# Patient Record
Sex: FEMALE | Race: WHITE | NOT HISPANIC OR LATINO | Employment: OTHER | URBAN - METROPOLITAN AREA
[De-identification: names, ages, dates, MRNs, and addresses within clinical notes are randomized per-mention and may not be internally consistent; named-entity substitution may affect disease eponyms.]

---

## 2017-01-03 ENCOUNTER — ALLSCRIPTS OFFICE VISIT (OUTPATIENT)
Dept: OTHER | Facility: OTHER | Age: 62
End: 2017-01-03

## 2017-01-18 ENCOUNTER — GENERIC CONVERSION - ENCOUNTER (OUTPATIENT)
Dept: OTHER | Facility: OTHER | Age: 62
End: 2017-01-18

## 2017-02-10 ENCOUNTER — ALLSCRIPTS OFFICE VISIT (OUTPATIENT)
Dept: OTHER | Facility: OTHER | Age: 62
End: 2017-02-10

## 2017-03-24 ENCOUNTER — HOSPITAL ENCOUNTER (OUTPATIENT)
Dept: RADIOLOGY | Facility: MEDICAL CENTER | Age: 62
Discharge: HOME | End: 2017-03-24
Payer: COMMERCIAL

## 2017-03-24 ENCOUNTER — ALLSCRIPTS OFFICE VISIT (OUTPATIENT)
Dept: OTHER | Facility: OTHER | Age: 62
End: 2017-03-24

## 2017-03-24 DIAGNOSIS — Z47.1 AFTERCARE FOLLOWING JOINT REPLACEMENT SURGERY: ICD-10-CM

## 2017-03-24 PROCEDURE — 73502 X-RAY EXAM HIP UNI 2-3 VIEWS: CPT

## 2018-01-09 NOTE — RESULT NOTES
Message   She has moderate arthritis in the Knee and Severe arthritis in the Hip  I would recommend that she sees an orthopedic surgeon  Verified Results  * XR KNEE 3 VW LEFT NON INJURY [id number] 12:01PM Eliudny Giana Order Number: [id number]     Test Name Result Flag Reference   XR KNEE 3 VW LEFT (Report)     LEFT KNEE     INDICATION: Left knee pain for years  COMPARISON: None     VIEWS: 3; 3 images     FINDINGS:     There is no acute fracture or dislocation  There is a moderate joint effusion  Tricompartmental degenerative osteoarthritis with joint space narrowing and marginal osteophyte formation most severely affecting the medial compartment  No lytic or blastic lesions are seen  Soft tissues are unremarkable  IMPRESSION:     Moderate joint effusion  Tricompartmental degenerative osteoarthritis with joint space narrowing and marginal osteophyte formation most severely affecting the medial compartment  Workstation performed: [id number]     Signed by: Mikayla Pop MD   8/25/16     * XR HIP/PELV 2-3 VWS RIGHT W PELVIS IF PERFORMED [id number] 12:01PM Chris Faria Order Number: [id number]     Test Name Result Flag Reference   * XR HIP/PELV 2-3 VWS RIGHT (Report)     RIGHT HIP     INDICATION: Right hip pain  COMPARISON: None     VIEWS: AP pelvis and 2 coned down views of the hip; 3 images     FINDINGS:     There is no acute fracture or dislocation  Severe right hip joint osteoarthritis with complete loss of the joint space, subchondral cyst formation, marginal osteophyte formation and sclerosis  Mild left hip joint osteoarthritis  No lytic or blastic lesions are seen  Soft tissues are unremarkable  IMPRESSION:     Severe right hip joint osteoarthritis with complete loss of the joint space, subchondral cyst formation, marginal osteophyte formation and sclerosis  Mild left hip joint osteoarthritis         Workstation performed: [id number]     Signed by:    Ursula Busch MD   8/25/16

## 2018-01-11 NOTE — RESULT NOTES
Message   she has nodules in her thyroid  They want to review previous imaging  to determine if any biopsy needs to be done  Did she get these records yet? Verified Results  US THYROID [id number] 12:23PM Aubrie Olson Order Number: [id number]    - Patient Instructions: To schedule this appointment, please contact Central Scheduling at [phone]  Test Name Result Flag Reference   US THYROID (Report)     THYROID ULTRASOUND     INDICATION: 43-year-old with thyroid nodules for follow-up  COMPARISON: Prior outside studies remain unavailable at this time  TECHNIQUE:  Ultrasound of the thyroid was performed with a high frequency linear transducer in transverse and sagittal planes including volumetric imaging sweeps as well as traditional still imaging technique  FINDINGS:   Normal homogeneous smooth echotexture  Right gland: 6 0 x 3 1 x 3 9 cm  There is a large complex cystic and solid mass occupying much of the RIGHT gland measuring 5 0 x 2 7 x 3 5 cm  There is perinodular color Doppler flow with questionable minimal intralesional flow  VERY LOW SUSPICION PATTERN (estimated risk of malignancy   < 3%) consider FNA > 2 cm versus observation  If patient's prior outside studies document the presence and stability of this finding, continued observation is a reasonable course of action  Anterior medial RIGHT upper pole measuring 1 0 x 0 5 x 0 9 cm  Solid hypoechoic nodule  INTERMEDIATE SUSPICION PATTERN (estimated risk of malignancy 10-20%) FNA recommended at > 1 cm  As noted above, if this is a stable finding when prior outside    studies become available, continued surveillance a be considered  Left gland: 4 4 x 2 0 x 1 8 cm  No dominant nodules  Medial posterior upper pole measuring 0 7 x 0 5 x 0 6 cm  Solid isoechoic nodule  LOW SUSPICION PATTERN (Estimated risk of malignancy 5-10%)  FNA recommended at >1 5 cm     Isthmus: 0 3 cm in AP dimension     No dominant nodules  IMPRESSION:      Bilateral nodules as described above  Prior outside studies as yet remain unavailable for comparison  An addendum will be provided when these become available  If these remain unavailable, FNAB may be considered for the dominant right-sided lesion and   borderline RIGHT upper pole hypoechoic nodule         Workstation performed: [id number]     Signed by:   Violetta Hollis MD   11/26/16

## 2018-01-12 NOTE — RESULT NOTES
Message   she has osteopenia - take calcium 1200 mg and 1000 units Vit D per day     Verified Results  * DXA BONE DENSITY SPINE HIP AND PELVIS [id number] 02:12PM Zelda Dutta Order Number: [id number]     Test Name Result Flag Reference   DXA BONE DENSITY SPINE HIP AND PELVIS (Report)     CENTRAL DXA SCAN     CLINICAL HISTORY:  59year old post-menopausal  female with history of asthma  The patient does not exercise and takes vitamin D supplements  TECHNIQUE: Bone densitometry was performed using a Hologic Horizon C bone densitometer  Regions of interest appear properly placed  There are degenerative changes of the lower lumbar spine  The BMD of L4 is elevated, compared to the BMD of L1-L3, most   likely secondary to degenerative change, although this should correlated with the patient's clinical history  L4 was excluded from calculation of lumbar spine BMD      COMPARISON: None  RESULTS:    LUMBAR SPINE: L1-L3:   BMD 1 053 gm/cm2   T-score 0 3   Z-score 1 9     LEFT TOTAL HIP:   BMD 0 865 gm/cm2   T-score -0 6   Z-score 0 5     LEFT FEMORAL NECK:   BMD 0 712 gm/cm2   T-score -1 2   Z-score 0 2             IMPRESSION:   1  Based on the [facility] classification, the T-score of -1 2 in the left femoral neck is consistent with low bone mineral density  2  The 10 year risk of hip fracture is 0 6 %, with the 10 year risk of major osteoporotic fracture being 7 8 %, as calculated by the WHO fracture risk assessment tool (FRAX)  The current NOF guidelines recommend treating patients with FRAX 10 year risk   score of >3% for hip fracture and >20% for major osteoporotic fracture  3  Any secondary causes of low bone mineral density should be excluded prior to treatment, if clinically indicated     4  A daily intake of at least 1200 mg calcium and 800 to 1000 IU of Vitamin D, as well as weight bearing and muscle strengthening exercise, fall prevention and avoidance of tobacco and excessive alcohol intake as basic preventive measures are suggested  5  Repeat DXA in 18 - 24 months, on the same machine, as clinically indicated         WHO CLASSIFICATION:   Normal (a T-score of -1 0 or higher)   Low bone mineral density (a T-score of less than -1 0 but higher than -2 5)   Osteoporosis (a T-score of -2 5 or less)   Severe osteoporosis (a T-score of -2 5 or less with a fragility fracture)             Workstation performed: [id number]     Signed by:   Kayla Miranda MD   9/29/16

## 2018-01-12 NOTE — RESULT NOTES
Message   breast u/s is normal  recheck yearly mammogram     Verified Results  *US BREAST RIGHT LIMITED (DIAGNOSTIC) [id number] 01:03PM Moo Mcintosh Order Number: [id number]    - Patient Instructions: To schedule this appointment, please contact Central Scheduling at [phone]  Test Name Result Flag Reference   US BREAST RIGHT LIMITED (Report)     Patient History:   Patient is postmenopausal and had first child at age 26  Family history of unknown cancer in maternal uncle at age 48 or    over and breast cancer in sister at age 50  Patient is a former smoker  Patient's BMI is 30 4  Reason for exam: additional evaluation requested from abnormal    screening  Mammo Diagnostic Right W CAD: 2016 - Check In #:    [de-identified]   ML and spot compression CC view(s) were taken of the right    breast      Technologist: KAYDEN Garduno)(M)   Prior study comparison: 2016, mammo screening    bilateral W CAD, performed at [place] [address]  2014, bilateral screening mammogram, performed at    [facility]  Follow-up images of the right breast reveal questionable    persistence of nodular asymmetry seen on screening mammography  Targeted ultrasound images the right breast were obtained  11:00   6 cm from the nipple, a 3 x 3 x 3 mm cyst is identified    potentially corresponding to the mammographic finding  No other    abnormalities were seen  US Breast Right Limited: 2016 - Check In #: [de-identified]   Technologist: RT Christian (R), RDMS     ASSESSMENT: BiRad:2 - Benign (Overall)     Recommendation:   Return to routine screening mammogram schedule  Analyzed by CAD     Transcription Location: [address]   Signing Station: [id number]     Risk Value(s):   Tyrer-Cuzick 10 Year: 9 810%, Tyrer-Cuzick Lifetime: 21 026%,    Myriad Table: 2 6%, AVINASH 5 Year: 3 2%, NCI Lifetime: 13 1%   Signed by:    Riki العلي MD   10/7/16     MAMMO DIAGNOSTIC RIGHT W CAD [id number] 01:03PM Sherita Ragsdale    Order Number: [id number]    - Patient Instructions: To schedule this appointment, please contact Central Scheduling at [phone]  Do not wear any perfume, powder, lotion or deodorant on breast or underarm area  Please bring your doctors order, referral (if needed) and insurance information with you on the day of the test  Failure to bring this information may result in this test being rescheduled  Arrive 15 minutes prior to your appointment time to register  On the day of your test, please bring any prior mammogram or breast studies with you that were not performed at a [facility]  Failure to bring prior exams may result in your test needing to be rescheduled  Test Name Result Flag Reference   MAMMO DIAGNOSTIC RIGHT W CAD (Report)     Patient History:   Patient is postmenopausal and had first child at age 26  Family history of unknown cancer in maternal uncle at age 48 or    over and breast cancer in sister at age 50  Patient is a former smoker  Patient's BMI is 30 4  Reason for exam: additional evaluation requested from abnormal    screening  Mammo Diagnostic Right W CAD: October 7, 2016 - Check In #:    [de-identified]   ML and spot compression CC view(s) were taken of the right    breast      Technologist: RT Lakisha(R)(M)   Prior study comparison: September 14, 2016, mammo screening    bilateral W CAD, performed at [address] [place] [address],[address]  June 19, 2014, bilateral screening mammogram, performed at    [facility]  Follow-up images of the right breast reveal questionable    persistence of nodular asymmetry seen on screening mammography  Targeted ultrasound images the right breast were obtained  11:00   6 cm from the nipple, a 3 x 3 x 3 mm cyst is identified    potentially corresponding to the mammographic finding   No other    abnormalities were seen  US Breast Right Limited: October 7, 2016 - Check In #: [de-identified]   Technologist: Willy Edwards, RT (R), RDMS     ASSESSMENT: BiRad:2 - Benign (Overall)     Recommendation:   Return to routine screening mammogram schedule  Analyzed by CAD     Transcription Location: [address]   Signing Station: [id number]     Risk Value(s):   Pollyer-Cuzick 10 Year: 9 810%, Pollyer-Barbara Lifetime: 21 026%,    Myriad Table: 2 6%, AVINASH 5 Year: 3 2%, NCI Lifetime: 13 1%   Signed by:    Nieves Macdonald MD   10/7/16

## 2018-01-12 NOTE — MISCELLANEOUS
Assessment    1  Arthritis of right hip (716 95) (M19 90)   2  Thyroid nodule (241 0) (E04 1)   3  Aftercare following right hip joint replacement surgery (V54 81,V43 64) (Z47 1,Z96 641)    Plan  Thyroid nodule    · US THYROID; Status:Hold For - Scheduling; Requested for:57Jtr8150;    Perform:[facility]; Due:69Sme7986; Ordered; For:Thyroid nodule; Ordered By:Edis Sloan Ards;    Discussion/Summary  Discussion Summary:   She is recovering well after a R hip replacement  She will continue therapy and f/u with her surgeon  For the thyroid nodule will plan to repeat u/s in 1 year  Medication SE Review and Pt Understands Tx: Possible side effects of new medications were reviewed with the patient/guardian today  The treatment plan was reviewed with the patient/guardian  The patient/guardian understands and agrees with the treatment plan      Chief Complaint  Chief Complaint Free Text Note Form: Patient is here for a hospital follow up for hip replacement  History of Present Illness  TCM Communication [facility] [place] [place] records were reviewed  She was hospitalized at [facility]  The date of admission: 12/19/2016, date of discharge: 12/21/2016  Diagnosis: hip replacement  She was discharged to home  Medications reviewed and updated today  Symptoms: weakness, fatigue, constipation, swelling and swelling location leg, but no fever, no dizziness, no headache, no cough, no shortness of breath, no chest pain, no back pain on left side, no back pain on right side, no arm pain left side, no arm pain on right side, no leg pain on left side, no leg pain on right side, no upper abdominal pain, no middle abdominal pain, no lower abdominal pain, no rash:, no anorexia, no nausea, no vomiting, no loose stools, no pain with urinating, no incisional pain and no wound drainage   facial numbness   Communication performed and completed by venkat fraga   HPI: 59year old here to f/u on R hip replacement on 12/19/16 with   Laura  She is only take Tylenol for pain She is feeling very well  She states there were no complications and her recovery is going well  She is doing PT  She is on Lovenox for anticoagulation  She had a thyroid u/s and biopsy for a suspicious nodule  Pathology was benign  She is asking about follow up from this  Review of Systems  Complete-Female:   Constitutional: No fever, no chills, feels well, no tiredness, no recent weight gain or weight loss  Eyes: No complaints of eye pain, no red eyes, no eyesight problems, no discharge, no dry eyes, no itching of eyes  ENT: no complaints of earache, no loss of hearing, no nose bleeds, no nasal discharge, no sore throat, no hoarseness  Cardiovascular: No complaints of slow heart rate, no fast heart rate, no chest pain, no palpitations, no leg claudication, no lower extremity edema  Respiratory: No complaints of shortness of breath, no wheezing, no cough, no SOB on exertion, no orthopnea, no PND  Gastrointestinal: No complaints of abdominal pain, no constipation, no nausea or vomiting, no diarrhea, no bloody stools  Genitourinary: No complaints of dysuria, no incontinence, no pelvic pain, no dysmenorrhea, no vaginal discharge or bleeding  Musculoskeletal: as noted in HPI  Integumentary: No complaints of skin rash or lesions, no itching, no skin wounds, no breast pain or lump  Neurological: No complaints of headache, no confusion, no convulsions, no numbness, no dizziness or fainting, no tingling, no limb weakness, no difficulty walking  Psychiatric: Not suicidal, no sleep disturbance, no anxiety or depression, no change in personality, no emotional problems  Endocrine: No complaints of proptosis, no hot flashes, no muscle weakness, no deepening of the voice, no feelings of weakness  Hematologic/Lymphatic: No complaints of swollen glands, no swollen glands in the neck, does not bleed easily, does not bruise easily        Active Problems 1  Arthritis of back (721 90) (M47 9)   2  Arthritis of knee, left (716 96) (M19 90)   3  Arthritis of right hip (716 95) (M19 90)   4  Enlarged thyroid (240 9) (E04 9)   5  Knee crepitus, right (719 66) (M23 8X1)   6  Neck arthritis (721 0) (M46 92)   7  Need for pneumococcal vaccination (V03 82) (Z23)   8  Pap smear for cervical cancer screening (V76 2) (Z12 4)   9  Scoliosis (737 30) (M41 9)   10  Thyroid nodule (241 0) (E04 1)    Past Medical History    1  History of Acute meniscal tear of left knee (836 2) (S83 207A)   2  Acute upper respiratory infection (465 9) (J06 9)    Surgical History    1  History of Appendectomy   2  History of Arthroscopy Knee Left   3  History of Biopsy Thyroid Using Percutaneous Core Needle   4  History of Cardiovascular Stress Test   5  History of  Section   6  History of Knee Surgery   7  History of Tonsillectomy   8  History of Total Hip Replacement Right  Surgical History Reviewed: The surgical history was reviewed and updated today  Family History  Mother    1  Family history of cardiac disorder (V17 49) (Z82 49)   2  Family history of hypertension (V17 49) (Z82 49)  Father    3  Family history of cardiac disorder (V17 49) (Z82 49)   4  Family history of hypertension (V17 49) (Z82 49)  Family History Reviewed: The family history was reviewed and updated today  Social History    · Former smoker (R57 87) (F06 411)  Social History Reviewed: The social history was reviewed and updated today  Current Meds   1  Cyclobenzaprine HCl - 10 MG Oral Tablet; TAKE 1 TABLET Every 8 hours PRN muscle   spasms; Therapy: 48JOK4979 to (Marcin Host)  Requested for: 62EVC5504; Last   Rx:2016 Ordered   2  Meclizine HCl - 25 MG Oral Tablet; TAKE 1 TABLET 3 TIMES DAILY AS NEEDED    Requested for: 72TXG2781; Last Rx:2016 Ordered   3  Multi-Vitamin TABS; Therapy: (Recorded:52Uas7736) to Recorded  Medication List Reviewed:    The medication list was reviewed and updated today  Allergies    1  No Known Drug Allergies    Vitals  Signs   Recorded: 89QSJ3389 10:52AM   Temperature: 98 3 F  Heart Rate: 90  Systolic: 353  Diastolic: 72  Height: 5 ft 8 in  Weight: 196 lb 9 6 oz  BMI Calculated: 29 89  BSA Calculated: 2 03  O2 Saturation: 96    Physical Exam    Constitutional   General appearance: No acute distress, well appearing and well nourished  Pulmonary   Respiratory effort: No increased work of breathing or signs of respiratory distress  Auscultation of lungs: Clear to auscultation  Cardiovascular   Auscultation of heart: Normal rate and rhythm, normal S1 and S2, without murmurs  Musculoskeletal   Gait and station: Abnormal   Gait evaluation demonstrated limping on the right  Skin   Skin and subcutaneous tissue: Abnormal   incision c/d/i R hip  Psychiatric   Orientation to person, place, and time: Normal     Mood and affect: Normal       The thyroid was diffusely enlarged  large midline nodule      Results/Data  (1) FINE NEEDLE ASPIRATION 58UDF7722 09:34AM Get [facility]     Test Name Result Flag Reference   LAB AP CASE REPORT (Report)     Non-gynecologic Cytology             Case: [id number]                  Authorizing Provider: Brina Maki MD     Collected:      12/15/2016 0934        Ordering Location:   [address] [place] [address]   Received:      12/15/2016 [address] [place] [address] Ultrasound                              Pathologist:      Yessenia Muñoz MD                             Specimens:  A) - Thyroid, Right, mid                                        B) - Thyroid, Right, mid                                        C) - Thyroid, Right, anterior medial upper                               D) - Thyroid, Right, anterior medial upper   LAB AP CYTO FINAL DIAGNOSIS (Report)     A - B  Thyroid, right mid, fine needle aspiration (Thin prep and smear   preparations):  Negative for malignancy (Brillion Category II) - See note    Scattered groups of benign follicular cells and scant colloid  Satisfactory for evaluation  C - D  Thyroid, right anterior medial upper, fine needle aspiration (Thin   prep and smear preparations):  Negative for malignancy (Kelly Category II) - See note  Scattered groups of benign follicular cells and scant colloid  Satisfactory for evaluation  Note: As reported in the [facility] for Reporting Thyroid   Cytopathology*, the diagnostic category of benign/negative for   malignancy carries 0% to 3% risk of malignancy being found in subsequent   resections (in the few studies of patients with benign FNA results that   were followed long-term, a falsenegative rate of <1% was reported), with   the usual management being clinical follow-up supplemented by  ultrasonography (US) as indicated  Repeat FNA may be indicated for nodules   showing significant growth or developing US abnormalities  Ultimately,   clinical/imaging correlation for this patient is needed in arriving at the   actual management plan  *The Kelly System for Reporting Thyroid Cytopathology, [facility] ), 2010  (1st ed )  Electronically signed by Axel Calhoun MD on 12/16/2016 at 1:55 PM   LAB AP INTRAOPERATIVE CONSULTATION      Thyroid, right mid, FNAB on-site evaluation: Adequate    Thyroid, right anterior medial upper,  FNAB on-site evaluation: Adequate  Dr Marci Tan   LAB AP GROSS DESCRIPTION (Report)     A  Thyroid, Right, mid: 20ml  Clear, received in CytoLyt    B  Thyroid, Right, mid: 6 slides received ( 3 diff quik / 3 alcohol fixed   )    C  Thyroid, Right, anterior medial upper: 20ml  Clear, received in CytoLyt    D   Thyroid, Right, anterior medial upper: 6 slides received ( 3 diff quik   / 3 alcohol fixed )   LAB AP NONGYN ADDITIONAL INFORMATION (Report)     [employer] FDA approved ,  and ThinPrep Imaging System are   utilized with strict adherence to the [facility] instruction manual to   prepare gynecologic and non-gynecologic cytology specimens for the   production of ThinPrep slides as well as for gynecologic ThinPrep imaging  These processes have been validated by our laboratory and/or by the     These tests were developed and their performance characteristics   determined by [facility] or [facility]  They may not be cleared or approved by the [place]  Food and   Drug Administration  The FDA has determined that such clearance or   approval is not necessary  These tests are used for clinical purposes  They should not be regarded as investigational or for research  This   laboratory has been approved by [place] [address], designated as a high-complexity   laboratory and is qualified to perform these tests  Interpretation performed at [facility], [address]   LAB AP CLINICAL INFORMATION (Report)     Size: RMP-5 4X2 85X3 44CM  Margins: SMOOTH Echogenicity: SOLID/CYSTIC Microcalcs: ABSENT Flow: INTRANODULAR/PERIPHERAL Size change: MINIMAL Suspicion level: NOT GIVEN Hx of Hashimoto's Thyroiditis: NO    Size: RAFAELA-1 21X 47X 85CM  Margins: SMOOTH Echogenicity: SOLID Microcalcs: ABSENT Flow: INTRANODULAR/PERIPHERAL Size change: MINIMAL Suspicion level: NOT GIVEN Hx of Hashimoto's Thyroiditis: NO     US GUIDED THYROID BIOPSY 95Qlm6860 08:48AM Reta Baltazar Order Number: [id number]   Performing Comments: appt is 12/15/2016 @ [address] [place] ~ bring script,photo id,insur card, and med list   - Patient Instructions: To schedule this appointment, please contact Central Scheduling at [phone]  Test Name Result Flag Reference   US GUIDED THYROID BIOPSY (Report)     ULTRASOUND-GUIDED THYROID BIOPSY     HISTORY: 59year-old with history of right-sided thyroid nodules x2  COMPARISON: 11/18/2016     FINDINGS:      Prior ultrasound images were reviewed   Large heterogeneous solid and cystic midpole nodule measuring 5 4 x 2 8 x 3 4 cm noted  There is a 2nd mid pole nodule more anteriorly and medially with homogeneous hypoechoic appearance measuring 0 8 x 1 2 x 0 5    cm  The procedure was explained to the patient, including risks of hemorrhage, infection and local injury  Informed consent was freely obtained  The patient verbalized expressed understanding of the above risks and wished to proceed with the procedure  Final standard time-out procedure performed  PROCEDURE: The neck was prepped and draped in normal sterile fashion  Under real-time ultrasound guidance and local anesthesia 3 passes with a 27 gauge needle were made through the the smaller homogeneous anterior nodule  Subsequently, 3 passes with    27-gauge needles were made through the larger mid pole heterogeneous solid and cystic nodule with results submitted to cytopathology  Cytopathology was present and deemed the specimens adequate for evaluation  The patient tolerated the procedure well  Postprocedure instructions were provided for the patient  I asked the patient to call us with any questions, concerns, or acute problems  The patient expressed understanding of the above  IMPRESSION:     Status post successful ultrasound-guided thyroid biopsy x2  Workstation performed: [id number]     Signed by:   Norma Hankins MD   12/15/16     Future Appointments    Date/Time Provider Specialty Site   01/03/2017 01:30 PM BESS Bryson   Orthopedic [address] [place] [address]     Signatures   Electronically signed by : Catie Ortiz MD; Dec 30 2016 11:09AM EST                       (Author)

## 2018-01-14 VITALS
BODY MASS INDEX: 29.4 KG/M2 | WEIGHT: 194 LBS | DIASTOLIC BLOOD PRESSURE: 80 MMHG | SYSTOLIC BLOOD PRESSURE: 139 MMHG | HEART RATE: 82 BPM | HEIGHT: 68 IN

## 2018-01-14 VITALS
BODY MASS INDEX: 29.4 KG/M2 | SYSTOLIC BLOOD PRESSURE: 134 MMHG | HEIGHT: 68 IN | DIASTOLIC BLOOD PRESSURE: 76 MMHG | WEIGHT: 194 LBS | HEART RATE: 94 BPM

## 2018-01-14 VITALS
WEIGHT: 194.38 LBS | SYSTOLIC BLOOD PRESSURE: 125 MMHG | DIASTOLIC BLOOD PRESSURE: 81 MMHG | HEIGHT: 68 IN | BODY MASS INDEX: 29.46 KG/M2 | HEART RATE: 111 BPM

## 2018-01-14 NOTE — RESULT NOTES
Message   she needs additional images and ultrasound of R breast  Will order     Verified Results  * MAMMO SCREENING BILATERAL W CAD [id number] 01:32PM Emma Landaverde Order Number: [id number]     Test Name Result Flag Reference   MAMMO SCREENING BILATERAL W CAD (Report)     Patient History:   Patient is postmenopausal and had first child at age 26  Family history of unknown cancer in maternal uncle at age 48 or    over and breast cancer in sister at age 50  Patient is a former smoker  Patient's BMI is 30 4  Reason for exam: screening (asymptomatic)  Screening     Mammo Screening Bilateral W CAD: September 14, 2016 - Check In #:   [de-identified]   Bilateral CC and MLO view(s) were taken  Technologist: ANKIT Feng (R)(M)   Prior study comparison: June 19, 2014, bilateral screening    mammogram, performed at [facility] at    [facility]  May 31, 2012, bilateral screening mammogram, performed    at [facility] at [facility]  The breast tissue is heterogeneously dense, potentially limiting    the sensitivity of mammography  Therefore, automated breast    ultrasound or MRI may be beneficial  Patient risk, included in    this report, assists in determining the appropriate adjunct    screening exam  3-D mammography may also be indicated as next    year's screening mammogram (based again on the above factors)  There is a predominantly circumscribed 6 mm nodular asymmetry in    the outer right breast, 9 cm from the nipple, seen with certainty   on the craniocaudal view only  This may be located in the upper   half of the breast on the mediolateral oblique view  The    patient should return for lateral and spot compression views,    possible rolled craniocaudal views and possible ultrasound, for    more definitive evaluation  The parenchymal pattern is otherwise stable   No dominant soft    tissue mass, architectural distortion or suspicious    calcifications are noted in either breast  The skin and nipple    contours are within normal limits  1  Outer posterior right breast 6 mm nodular asymmetry  Lateral   and spot compression views, possible rolled craniocaudal views    and possible ultrasound, recommended  2  Stable left breast mammogram      ASSESSMENT: BiRad:0 - Incomplete: needs additional imaging    evaluation - Right     Recommendation:   Further imaging of the right breast    A breast health care nurse from our facility will be contacting    the patient regarding the need for additional imaging  Analyzed by CAD     8-10% of cancers will be missed on mammography  Management of a    palpable abnormality must be based on clinical grounds  Patients   will be notified of their results via letter from our facility  Accredited by [employer] of Radiology and FDA       Transcription Location: [facility]: [id number]     Risk Value(s):   Tyrer-Cuzick 10 Year: 9 810%, Tyrer-Cuzick Lifetime: 21 026%,    Myriad Table: 2 6%, AVINASH 5 Year: 3 2%, NCI Lifetime: 13 1%   Signed by:   Gerardo Ervin MD   9/28/16

## 2018-01-15 NOTE — PROGRESS NOTES
Preliminary Nursing Report                Patient Information    Initial Encounter Entry Date:   2016 9:15 AM EST (Automated Transmission Automated Transmission)       Last Modified:   {ParH  ModifiedOn}              Legal Name: Tarik Ambriz        Social Security Number:        YOB: 1955        Age (years): 59        Gender: F        Body Mass Index (BMI): 29 kg/m2        Height: 68 in  Weight: 194 lbs (88 kgs)           Address:   [address] [place] [id number]               Phone: [phone]   (consent to leave messages)        Email:        Ethnicity: Decline to State        Mandaeism:        Marital Status:        Preferred Language: English        Race: Other Race                    Patient Insurance Information        Primary Insurance Information Carrier Name: {Primary  CarrierName}           Carrier Address:   {Primary  CarrierAddress}              Carrier Phone: {Primary  CarrierPhone}          Group Number: {Primary  GroupNumber}          Policy Number: {Primary  PolicyNumber}          Insured Name: {Primary  InsuredName}          Insured : {Primary  InsuredDOB}          Relationship to Insured: {Primary  RelationshiptoInsured}           Secondary Insurance Information Carrier Name: {Secondary  CarrierName}           Carrier Address:   {Secondary  CarrierAddress}              Carrier Phone: {Secondary  CarrierPhone}          Group Number: {Secondary  GroupNumber}          Policy Number: {Secondary  PolicyNumber}          Insured Name: {Secondary  InsuredName}          Insured : {Secondary  InsuredDOB}          Relationship to Insured: {Secondary  RelationshiptoInsured}                       Health Profile   Booking #:   Rosalba Reaves #: [phone]               DOS: 2016    Surgery : TOTAL HIP REPLACEMENT    Add'l Procedures/Notes:     Surgery Risk: Intermediate          Precautions          Allergies    No Known Drug Allergies Medications    Aleve 220 MG Oral Tablet       Cyclobenzaprine HCl - 10 MG Oral Tablet       Meclizine HCl - 25 MG Oral Tablet       Multi-Vitamin TABS       Ventolin  (90 Base) MCG/ACT Inhalation Aerosol Solution               Conditions    Abnormal mammogram       Arthritis of back       Arthritis of knee, left       Arthritis of right hip       Encounter for screening for osteoporosis       Enlarged thyroid       Knee crepitus, right       Leg muscle spasm       Neck arthritis       Pap smear for cervical cancer screening       Right hip pain       Scoliosis       Thyroid nodule       Vertigo, benign paroxysmal       Visit for screening mammogram               Family History    None             Surgical History    None             Social History    Former smoker                               Patient Instructions       ? NPO Instructions   The day before surgery it is recommended to have a light dinner at your usual time and you are allowed a light snack early in the evening  Do not eat anything heavy or eat a big meal after 7pm  Do not eat or drink anything after midnight prior to your surgery  If you are supposed to take any of your medications, do so with a sip of water  Failure to follow these instructions can lead to an increased risk of lung complications and may result in a delay or cancellation of your procedure  If you have any questions, contact your institution for further instructions  No candy, no gum, no mints, no chewing tobacco   Triggered by: Medical Procedure Risk         ? Bronchodilator 18  Please continue the following medications up to and including the day of surgery  Please bring this medication with you on the day of surgery  Triggered by: Ventolin  (90 Base) MCG/ACT Inhalation Aerosol Solution         ? NSAID (Pain Medication) 61  Please stop ibuprofen, naproxen and other non-steroidal anti-inflammatory drugs (NSAIDS) for 24 hrs before surgery    Triggered by: Aleve 220 MG Oral Tablet               Testing Considerations       ? Complete Blood Count (CBC) t  If test was completed and normal within last six months, repeat test is not necessary  Triggered by: Thyroid nodule         ? Comprehensive Metabolic Panel (CMP) t  If test was completed and normal within last six months, repeat test is not necessary  Triggered by: Thyroid nodule         ? Electrocardiogram (ECG) t  Patient does not need new test if normal ECG is present within the last six months and no change in clinical condition  Triggered by: Thyroid nodule, Age or Facility Rec         ? Hemoglobin A1c (HbA1c) client  If test was completed and normal within the last three months, repeat test is not necessary  Triggered by: Age or Facility Rec         ? Thyroid function tests (TFTs) t  Consider Thyroid Function Tests if there is a change in condition  Triggered by: Thyroid nodule         ? Type and Screen client  Type and Screen - Blood: If there is anticipated or possible large blood loss with this procedure, then a Type and Screen for Blood should be ordered  Triggered by: Age or Facility Rec               Consultations       ? Primary Care Physician Evaluation   Primary care physician may need to evaluate patient prior to surgery  This is likely NOT necessary if the listed conditions are chronic and stable  Triggered by: Thyroid nodule               Miscellaneous Questions         Question: Are you able to walk up a flight of stairs, walk up a hill or do heavy housework WITHOUT having chest pain or shortness of breath? Answer: YES                   Allergies/Conditions/Medications Not Found        The following were not recognized by our system when generating the recommendations  Please consider if this would impact any preoperative protocols  ? Encounter for screening for osteoporosis       ? Visit for screening mammogram       ? Cyclobenzaprine HCl - 10 MG Oral Tablet       ?  Multi-Vitamin TABS Appointment Information         Date:    12/19/2016        Location:    [facility]        Address:           Directions:                      Footnotes revision 14      ?? Denotes a free-text entry  Legal Disclaimer: Any and all recommendations and services provided herein are designed to assist in the preoperative care of the patient  Nothing contained herein is designed to replace, eliminate or alleviate the responsibility of the attending physician to supervise and determine the patient?s preoperative care and course of treatment  Failure to provide complete, accurate information may negatively impact the system?s ability to recommend the proper preoperative protocol  THE ATTENDING PHYSICIAN IS RESPONSIBLE TO REVIEW THE SUGGESTED PREOPERATIVE PROTOCOLS/COURSE OF TREATMENT AND PRESCRIBE THE FINAL COURSE OF PREOPERATIVE TREATMENT IN CONSULTATION WITH THE PATIENT  THE ePREOP SYSTEM AND ITS MATERIALS ARE PROVIDED ? AS IS? WITHOUT WARRANTY OF ANY KIND, EXPRESS OR IMPLIED, INCLUDING, BUT NOT LIMITED TO, WARRANTIES OF PERFORMANCE OR MERCHANTABILITY OR FITNESS FOR A PARTICULAR PURPOSE  PATIENT AND PHYSICIANS HEREBY AGREE THAT THEIR USE OF THE MATERIALS AND RESOURCES ACT AS A CONSENT TO RELEASE AND WAIVE ePREOP FROM ANY AND ALL CLAIMS OF WARRANTY, TORT OR CONTRACT LAW OF ANY KIND  Electronically signed by:Mahin PATINO    Nov 29 2016  9:21AM EST

## 2018-01-16 NOTE — RESULT NOTES
Message   Thyroid biopsy is benign     Verified Results  (1) FINE NEEDLE ASPIRATION [id number] 09:34AM Johnosn Hutson     Test Name Result Flag Reference   LAB AP CASE REPORT (Report)     Non-gynecologic Cytology             Case: [id number]                  Authorizing Provider: Leeanna Elam MD     Collected:      12/15/2016 0934        Ordering Location:   [address] [place] [address]   Received:      12/15/2016 [facility]                              Pathologist:      Luis Krishnamurthy MD                             Specimens:  A) - Thyroid, Right, mid                                        B) - Thyroid, Right, mid                                        C) - Thyroid, Right, anterior medial upper                               D) - Thyroid, Right, anterior medial upper   LAB AP CYTO FINAL DIAGNOSIS (Report)     A - B  Thyroid, right mid, fine needle aspiration (Thin prep and smear   preparations):  Negative for malignancy ([place] Category II) - See note  Scattered groups of benign follicular cells and scant colloid  Satisfactory for evaluation  C - D  Thyroid, right anterior medial upper, fine needle aspiration (Thin   prep and smear preparations):  Negative for malignancy ([place] Category II) - See note  Scattered groups of benign follicular cells and scant colloid  Satisfactory for evaluation  Note: As reported in the [facility] for Reporting Thyroid   Cytopathology*, the diagnostic category of benign/negative for   malignancy carries 0% to 3% risk of malignancy being found in subsequent   resections (in the few studies of patients with benign FNA results that   were followed long-term, a falsenegative rate of <1% was reported), with   the usual management being clinical follow-up supplemented by  ultrasonography (US) as indicated  Repeat FNA may be indicated for nodules   showing significant growth or developing US abnormalities   Ultimately, clinical/imaging correlation for this patient is needed in arriving at the   actual management plan  *The [employer] System for Reporting Thyroid Cytopathology, Zheng Keene Faroese Coreen Lala ), 2010  (1st ed )  Electronically signed by Sal Morris MD on 12/16/2016 at 1:55 PM   LAB AP INTRAOPERATIVE CONSULTATION      Thyroid, right mid, FNAB on-site evaluation: Adequate    Thyroid, right anterior medial upper,  FNAB on-site evaluation: Adequate  Dr Zulema Brown   LAB AP GROSS DESCRIPTION (Report)     A  Thyroid, Right, mid: 20ml  Clear, received in CytoLyt    B  Thyroid, Right, mid: 6 slides received ( 3 diff quik / 3 alcohol fixed   )    C  Thyroid, Right, anterior medial upper: 20ml  Clear, received in CytoLyt    D  Thyroid, Right, anterior medial upper: 6 slides received ( 3 diff quik   / 3 alcohol fixed )   LAB AP NONGYN ADDITIONAL INFORMATION (Report)     [employer] FDA approved ,  and ThinPrep Imaging System are   utilized with strict adherence to the 's instruction manual to   prepare gynecologic and non-gynecologic cytology specimens for the   production of ThinPrep slides as well as for gynecologic ThinPrep imaging  These processes have been validated by our laboratory and/or by the     These tests were developed and their performance characteristics   determined by [employer] [address] [place] [address] or [employer]  They may not be cleared or approved by the [place]  Food and   Drug Administration  The FDA has determined that such clearance or   approval is not necessary  These tests are used for clinical purposes  They should not be regarded as investigational or for research  This   laboratory has been approved by CLIA 88, designated as a high-complexity   laboratory and is qualified to perform these tests      Interpretation performed at [facility]   LAB AP CLINICAL INFORMATION (Report)     Size: RMP-5 4X2 85X3 44CM  Margins: SMOOTH Echogenicity: SOLID/CYSTIC Microcalcs: ABSENT Flow: INTRANODULAR/PERIPHERAL Size change: MINIMAL Suspicion level: NOT GIVEN Hx of Hashimoto's Thyroiditis: NO    Size: RAFAELA-1 21X 47X 85CM   Margins: SMOOTH Echogenicity: SOLID Microcalcs: ABSENT Flow: INTRANODULAR/PERIPHERAL Size change: MINIMAL Suspicion level: NOT GIVEN Hx of Hashimoto's Thyroiditis: NO

## 2018-01-17 NOTE — PROGRESS NOTES
Assessment    1  Arthritis of knee, left (656 96) (M19 90)   2  Arthritis of right hip (136 95) (M19 90)    Plan  Arthritis of right hip    · Follow-up visit in 3 months Evaluation and Treatment  Follow-up  Status: Complete   Done: [id number]   · Fluoroscopic Guidance For Needle Placement; Status:Complete;   Done: 18JND0403    Discussion/Summary    66-year-old female presented clinic for evaluation of left knee instability and right groin pain  She has radiographic evidence of arthritis in both areas  Exam is consistent with a correctable varus deformity of the left knee and arthritis in her right hip  Since the left knee is painless we will o treat her with a medial offloading brace which she can wear during activity  and recommend her for a fluososcopic guided steroid injection into her right hip  She can continue taking Aleve as needed and she'll follow-up in 3 months for reevaluation  Chief Complaint    1  Knee Pain    History of Present Illness  HPI: 66-year-old male presented clinic for evaluation of left knee instability and right groin pain  She says approximately 30 years ago she is not wrestling sustained an injury to her left knee  She had arthroscopic knee surgery and she was 30years old  Today patient says she has a approximately a one year history of instability in her left knee, she notices more when walking  Occasionally her knee will buckle but has never caused her to fall  She is as described any knee pain  Her right groin pain has been present for years well made worse with prolonged standing and walking  It is limiting her ability to put on her shoes and activities  Reports a numbness or tingling, or back pain  She currently takes Aleve which gives her much relief of her groin pain      Review of Systems    Constitutional: No fever, no chills, feels well, no tiredness, no recent weight gain or loss  Eyes: No complaints of eyesight problems, no red eyes     ENT: no loss of hearing, no nosebleeds, no sore throat  Cardiovascular: No complaints of chest pain, no palpitations, no leg claudication or lower extremity edema  Respiratory: no compliants of shortness of breath, no wheezing, no cough  Gastrointestinal: no complaints of abdominal pain, no constipation, no nausea or diarrhea, no vomiting, no bloody stools  Genitourinary: no complaints of dysuria, no incontinence  Musculoskeletal: as noted in HPI  Integumentary: no complaints of skin rash or lesion, no itching or dry skin, no skin wounds  Neurological: no complaints of headache, no confusion, no numbness or tingling, no dizziness  Endocrine: No complaints of muscle weakness, no feelings of weakness, no frequent urination, no excessive thirst    Psychiatric: No suicidal thoughts, no anxiety, no feelings of depression  Active Problems    1  Arthritis of back (721 90) (M47 9)   2  Arthritis of knee, left (716 96) (M19 90)   3  Encounter for screening for osteoporosis (V82 81) (Z13 820)   4  Leg muscle spasm (728 85) (M62 838)   5  Neck arthritis (721 0) (M46 92)   6  Pap smear for cervical cancer screening (V76 2) (Z12 4)   7  Right hip pain (719 45) (M25 551)   8  Scoliosis (737 30) (M41 9)   9  Thyroid nodule (241 0) (E04 1)   10  Visit for screening mammogram (V76 12) (Z12 31)    Past Medical History    · History of Acute meniscal tear of left knee (836 2) (C45 406U)   · Acute upper respiratory infection (465 9) (J06 9)    The active problems and past medical history were reviewed and updated today  Surgical History    · History of Appendectomy   · History of Arthroscopy Knee Left   · History of Biopsy Thyroid Using Percutaneous Core Needle   · History of Cardiovascular Stress Test   · History of  Section   · History of Knee Surgery   · History of Tonsillectomy    The surgical history was reviewed and updated today         Family History  Mother    · Family history of cardiac disorder (V17 49) (Z82 49)   · Family history of hypertension (V17 49) (Z82 49)  Father    · Family history of cardiac disorder (V17 49) (Z82 49)   · Family history of hypertension (V17 49) (Z82 49)    The family history was reviewed and updated today  Social History    · Former smoker (V61 86) (A74 932)  The social history was reviewed and updated today  Current Meds   1  Aleve 220 MG Oral Tablet; Therapy: (Recorded:05May2016) to Recorded   2  Claritin-D 12 Hour 5-120 MG Oral Tablet Extended Release 12 Hour; Therapy: (Recorded:05May2016) to Recorded   3  Cyclobenzaprine HCl - 10 MG Oral Tablet; TAKE 1 TABLET Every 8 hours PRN muscle   spasms; Therapy: 32YVY4280 to (Evaluate:04Jun2016)  Requested for: 57AVX4027; Last   [id number] Ordered   4  Multi-Vitamin TABS; Therapy: (Recorded:05May2016) to Recorded   5  Ventolin  (90 Base) MCG/ACT Inhalation Aerosol Solution; Therapy: (Recorded:05May2016) to Recorded    Allergies    1  No Known Drug Allergies    Vitals  Signs    Systolic: 566  Diastolic: 82  Heart Rate: 84  Height: 5 ft 8 in  Weight: 202 lb 10 08 oz  BMI Calculated: 30 81  BSA Calculated: 2 06    Physical Exam  Normal gait and station  Left knee: Skin intact  Correctable varus alignment  Nontender palpation over entire aspect of her knee  Extension 0 degrees  flexion 110 degrees   Laxity with varus stress and anterior drawer compared to the contralateral limb  No knee effusion  5 out of 5 quad and hamstring strength  Right hip: Skin intact  Leg lengths equal  Patient has limitation of hip flexion and no internal rotation which re-creates her pain  5 out of 5 strength in hip flexors and extensors  Sensation intact L1 S1   Constitutional - General appearance: Normal       Results/Data  I personally reviewed the films/images/results in the office today  My interpretation follows     X-ray Review AP lateral left knee shows bone-on-bone contact and osteophytic changes in the medial compartment of the left knee as well as patellofemoral arthritic changes  AP lateral of right hip shows joint space obliteration, and osteophytic changes and subchondral sclerosis of the femoral acetabular joint  Attending Note  Attending Note [facility]: Attending Note: I interviewed, took the history and examined the patient, the staff discussed the patient on the day of the visit, I discussed the case with the Resident and reviewed the Resident's note, I supervised the Resident and I agree with the Resident management plan as it was presented to me  Level of Participation: I was present in clinic and examined the patient  Patient's History: 73-year-old female seen for evaluation of right hip and groin and right foot pain as well as pain in left knee  She is undergone 2 surgeries in the left knee both arthroscopic lateral being 30 years ago  She describes pain in the medial aspect left knee as well as a feeling of instability area and she does have morning stiffness left knee the last less than 20 minutes  Key Parts of the Exam: Exam confirms restriction of hip motion of the right side and a prominent tattoo down the inferolateral aspect of the right lower leg  Amanda and left knee revealed varus inclination  She is passively correctable neutral  She is bony enlargement tenderness medially  There is crepitation with flexion-extension  There is no palpable warmth the synovium  She is a prominent tattoo along the lateral aspect the left lower leg  Toes are warm, sensate, and mobile and there is no limb length inequality  Have reviewed x-rays the right hip show absent joint space and degenerative changes within the right hip joint proper  Views left knee show medial and patellofemoral compartment arthritis of considerable nature  Diagnosis and Plan: Arthritic right hip and arthritic left knee, both of these patient wishes to have nonsurgical treatment for now  A fluoroscopic-guided injection to the right hip joint is advised   In the severe range via interventional radiology  As her left knee is passively correctable to neutral, a medial compartment offloading device may result in considerable relief of her current left knee symptoms  She was scheduled for the fluoroscopic an injection right hip and she'll BC be contacted by the [facility] extremities for left knee brace and I would welcome the opportunity see back in 3 months time for follow-up  I agree with the Resident's note  Future Appointments    Date/Time Provider Specialty Site   12/09/2016 01:00 PM BESS Chow  Orthopedic [address] [place] [address] Dr Jerome   Electronically signed by :  BESS Gracia ; Sep  9 2016 10:28AM EST                       (Author)    Electronically signed by : BESS Chacon ; Sep  9 2016  2:19PM EST                       (Author)

## 2018-01-23 NOTE — PROGRESS NOTES
Assessment    1  Preop examination (V72 84) (Z01 818)   2  Arthritis of right hip (716 95) (M19 90)   3  Need for pneumococcal vaccination (V03 82) (Z23)    Discussion/Summary  Surgical Clearance: She is at a LOW risk from a cardiovascular standpoint at this time without any additional cardiac testing  Reevaluation needed, if she should present with symptoms prior to surgery/procedure  Surgical clearance faxed to       Medically jojo for surgery  Low CV risk  Pneumococcal vaccine today  Possible side effects of new medications were reviewed with the patient/guardian today  The treatment plan was reviewed with the patient/guardian  The patient/guardian understands and agrees with the treatment plan      Chief Complaint  Patient is here for surgical clearance for right hip replacement by Dr Grazyna Narvaez  History of Present Illness  Pre-Op Visit (Brief): The patient is being seen for a preoperative visit  The procedure is a(n) Right hip arthroplasty scheduled for 12/19/16 with Dr Grazyna Narvaez  The indication for surgery is Hip OA  Surgical Risk Assessment:   Prior Anesthesia: She had prior anesthesia and no prior adverse reaction to general anesthesia  Pertinent Past Medical History: asthma, but no CAD, no chronic liver disease, no pulmonary embolism, no DVT, does not use anticoagulants, no diabetes, does not use insulin, no thyroid disease, no seizure disorder, no CVA and no renal disease  Exercise Capacity: able to walk two flights of stairs without symptoms  Lifestyle Factors: denies tobacco use  Symptoms: no symptoms  Pertinent Family History: no pertinent family history  Living Situation: home is secure and supportive  HPI: She is here for pre-op clearance for R hip replacement  She has no acute complaints  Pre op testing was reviewed and was all normal       Review of Systems    Constitutional: No fever, no chills, feels well, no tiredness, no recent weight gain or weight loss     Eyes: No complaints of eye pain, no red eyes, no eyesight problems, no discharge, no dry eyes, no itching of eyes  ENT: no complaints of earache, no loss of hearing, no nose bleeds, no nasal discharge, no sore throat, no hoarseness  Cardiovascular: No complaints of slow heart rate, no fast heart rate, no chest pain, no palpitations, no leg claudication, no lower extremity edema  Respiratory: No complaints of shortness of breath, no wheezing, no cough, no SOB on exertion, no orthopnea, no PND  Gastrointestinal: No complaints of abdominal pain, no constipation, no nausea or vomiting, no diarrhea, no bloody stools  Genitourinary: No complaints of dysuria, no incontinence, no pelvic pain, no dysmenorrhea, no vaginal discharge or bleeding  Musculoskeletal: arthralgias  Integumentary: No complaints of skin rash or lesions, no itching, no skin wounds, no breast pain or lump  Neurological: No complaints of headache, no confusion, no convulsions, no numbness, no dizziness or fainting, no tingling, no limb weakness, no difficulty walking  Psychiatric: Not suicidal, no sleep disturbance, no anxiety or depression, no change in personality, no emotional problems  Endocrine: No complaints of proptosis, no hot flashes, no muscle weakness, no deepening of the voice, no feelings of weakness  Hematologic/Lymphatic: No complaints of swollen glands, no swollen glands in the neck, does not bleed easily, does not bruise easily  Active Problems    1  Abnormal blood sugar (790 29) (R73 09)   2  Abnormal mammogram (793 80) (R92 8)   3  Arthritis of back (721 90) (M47 9)   4  Arthritis of knee, left (716 96) (M19 90)   5  Arthritis of right hip (716 95) (M19 90)   6  Encounter for screening for osteoporosis (V82 81) (Z13 820)   7  Enlarged thyroid (240 9) (E04 9)   8  Knee crepitus, right (719 66) (M23 91)   9  Leg muscle spasm (728 85) (M62 838)   10  Neck arthritis (721 0) (M46 92)   11   Pap smear for cervical cancer screening (V76  2) (Z12 4)   12  Right hip pain (719 45) (M25 551)   13  Scoliosis (737 30) (M41 9)   14  Thyroid nodule (241 0) (E04 1)   15  Vertigo, benign paroxysmal (386 11) (H81 10)   16  Visit for screening mammogram (V76 12) (Z12 31)    Past Medical History    · History of Acute meniscal tear of left knee (836 2) (I48 172Q)   · Acute upper respiratory infection (465 9) (J06 9)    The active problems and past medical history were reviewed and updated today  Surgical History    · History of Appendectomy   · History of Arthroscopy Knee Left   · History of Biopsy Thyroid Using Percutaneous Core Needle   · History of Cardiovascular Stress Test   · History of  Section   · History of Knee Surgery   · History of Tonsillectomy    The surgical history was reviewed and updated today  Family History  Mother    · Family history of cardiac disorder (V17 49) (Z82 49)   · Family history of hypertension (V17 49) (Z82 49)  Father    · Family history of cardiac disorder (V17 49) (Z82 49)   · Family history of hypertension (V17 49) (Z82 49)    The family history was reviewed and updated today  Social History    · Former smoker (J45 30) (Y79 727)  The social history was reviewed and updated today  Current Meds   1  Aleve 220 MG Oral Tablet; Therapy: (Recorded:2016) to Recorded   2  Cyclobenzaprine HCl - 10 MG Oral Tablet; TAKE 1 TABLET Every 8 hours PRN muscle   spasms; Therapy: 61TZZ8814 to (Roger Núñez)  Requested for: 81ONM5457; Last   Rx:2016 Ordered   3  Meclizine HCl - 25 MG Oral Tablet; TAKE 1 TABLET 3 TIMES DAILY AS NEEDED    Requested for: 15JND9175; Last Rx:2016 Ordered   4  Multi-Vitamin TABS; Therapy: (Recorded:2016) to Recorded   5  Ventolin  (90 Base) MCG/ACT Inhalation Aerosol Solution; Therapy: (Recorded:2016) to Recorded    The medication list was reviewed and updated today  Allergies    1   No Known Drug Allergies    Vitals   Recorded: 07VTW2144 09:41AM   Temperature 98 1 F   Heart Rate 720   Systolic 305   Diastolic 92   Height 5 ft 8 in   Weight 197 lb    BMI Calculated 29 95   BSA Calculated 2 04   O2 Saturation 94     Physical Exam    Constitutional   General appearance: No acute distress, well appearing and well nourished  Eyes   Conjunctiva and lids: No swelling, erythema or discharge  Pupils and irises: Equal, round, reactive to light  Ears, Nose, Mouth, and Throat   Oropharynx: Normal with no erythema, edema, exudate or lesions  Neck   Thyroid: Abnormal   enlarged, R sided nodule  Pulmonary   Respiratory effort: No increased work of breathing or signs of respiratory distress  Auscultation of lungs: Clear to auscultation  Cardiovascular   Auscultation of heart: Normal rate and rhythm, normal S1 and S2, no murmurs  Examination of extremities for edema and/or varicosities: Normal     Musculoskeletal   Gait and station: Normal     Skin   Skin and subcutaneous tissue: Normal without rashes or lesions  Palpation of skin and subcutaneous tissue: Normal turgor  Psychiatric   Judgment and insight: Normal     Orientation to person, place, and time: Normal     Recent and remote memory: Intact  Mood and affect: Normal     Right Hip: Special Tests: positive JONATHAN test       Results/Data  (1) CBC/PLT/DIFF 03QWF1936 12:18PM Jessicanorbert Silva Order Number: [id number]     Test Name Result Flag Reference   WBC COUNT 6 30 Thousand/uL  4 31-10 16   RBC COUNT 4 51 Million/uL  3 81-5 12   HEMOGLOBIN 14 8 g/dL  11 5-15 4   HEMATOCRIT 43 7 %  34 8-46  1   MCV 97 fL  82-98   MCH 32 8 pg  26 8-34 3   MCHC 33 9 g/dL  31 4-37 4   RDW 12 6 %  11 6-15 1   MPV 9 9 fL  8 9-12 7   PLATELET COUNT 352 Thousands/uL  149-390   nRBC AUTOMATED 0 /100 WBCs     NEUTROPHILS RELATIVE PERCENT 58 %  43-75   LYMPHOCYTES RELATIVE PERCENT 32 %  14-44   MONOCYTES RELATIVE PERCENT 7 %  4-12   EOSINOPHILS RELATIVE PERCENT 2 %  0-6   BASOPHILS RELATIVE PERCENT 1 %  0-1   NEUTROPHILS ABSOLUTE COUNT 3 70 Thousands/?L  1 85-7 62   LYMPHOCYTES ABSOLUTE COUNT 1 99 Thousands/?L  0 60-4 47   MONOCYTES ABSOLUTE COUNT 0 41 Thousand/?L  0 17-1 22   EOSINOPHILS ABSOLUTE COUNT 0 15 Thousand/?L  0 00-0 61   BASOPHILS ABSOLUTE COUNT 0 04 Thousands/?L  0 00-0 10   - Patient Instructions: This bloodwork is non-fasting  Please drink two glasses of water morning of bloodwork  - Patient Instructions: This bloodwork is non-fasting  Please drink two glasses of water morning of bloodwork  (1) COMPREHENSIVE METABOLIC PANEL 80UQV9565 38:26UX Aj Jiménez Order Number: [id number]     Test Name Result Flag Reference   GLUCOSE,RANDM 80 mg/dL     If the patient is fasting, the ADA then defines impaired fasting glucose as > 100 mg/dL and diabetes as > or equal to 123 mg/dL  SODIUM 139 mmol/L  136-145   POTASSIUM 4 1 mmol/L  3 5-5 3   CHLORIDE 106 mmol/L  100-108   CARBON DIOXIDE 27 mmol/L  21-32   ANION GAP (CALC) 6 mmol/L  4-13   BLOOD UREA NITROGEN 19 mg/dL  5-25   CREATININE 0 98 mg/dL  0 60-1 30   Standardized to IDMS reference method   CALCIUM 9 2 mg/dL  8 3-10 1   BILI, TOTAL 0 69 mg/dL  0 20-1 00   ALK PHOSPHATAS 79 U/L     ALT (SGPT) 29 U/L  12-78   AST(SGOT) 15 U/L  5-45   ALBUMIN 4 3 g/dL  3 5-5 0   TOTAL PROTEIN 7 0 g/dL  6 4-8 2   eGFR Non-African American 57 3 ml/min/1 73sq m     - Patient Instructions: This bloodwork is non-fasting  Please drink two glasses of water morning of bloodwork  National Kidney Disease Education Program recommendations are as follows:  GFR calculation is accurate only with a steady state creatinine  Chronic Kidney disease less than 60 ml/min/1 73 sq  meters  Kidney failure less than 15 ml/min/1 73 sq  meters       (1) TYPE & SCREEN 07IBH1262 12:18PM Aj Jiménez Order Number: [id number]     Test Name Result Flag Reference   ABO GROUPING A     RH FACTOR Positive     ANTIBODY SCREEN Negative (1) HEMOGLOBIN A1C 25XOS5633 12:18PM Tono Bejarano Order Number: [id number]     Test Name Result Flag Reference   HEMOGLOBIN A1C 5 2 %  4 2-6 3   EST  AVG  GLUCOSE 103 mg/dl       (1) TSH 75TSJ0337 12:18PM Tono Bejarano Order Number: [id number]     Test Name Result Flag Reference   TSH 1 740 uIU/mL  0 358-3 740   - Patient Instructions: This bloodwork is non-fasting  Please drink two glasses of water morning of bloodwork  - Patient Instructions: This bloodwork is non-fasting  Please drink two glasses of water morning of bloodwork  Patients undergoing fluorescein dye angiography may retain small amounts of fluorescein in the body for 48-72 hours post procedure  Samples containing fluorescein can produce falsely depressed TSH values  If the patient had this procedure,a specimen should be resubmitted post fluorescein clearance  The recommended reference ranges for TSH during pregnancy are as follows:  First trimester 0 1 to 2 5 uIU/mL  Second trimester  0 2 to 3 0 uIU/mL  Third trimester 0 3 to 3 0 uIU/m     End of Encounter Meds    1  Cyclobenzaprine HCl - 10 MG Oral Tablet; TAKE 1 TABLET Every 8 hours PRN muscle   spasms; Therapy: 27WVM0151 to ([id number])  Requested for: 13WRE4086; Last   Rx:10Nov2016 Ordered    2  Meclizine HCl - 25 MG Oral Tablet; TAKE 1 TABLET 3 TIMES DAILY AS NEEDED    Requested for: 06NBF8588; Last Rx:10Nov2016 Ordered    3  Aleve 220 MG Oral Tablet; Therapy: (Recorded:33Qqy0182) to Recorded   4  Multi-Vitamin TABS; Therapy: (Recorded:98Nrj5539) to Recorded   5  Ventolin  (90 Base) MCG/ACT Inhalation Aerosol Solution; Therapy: (Recorded:50Azf0048) to Recorded    Future Appointments    Date/Time Provider Specialty Site   12/19/2016 10:15 AM BESS Randle  [address] [place] [address] OR   12/27/2016 01:30 PM BESS Randle   [facility]   01/03/2017 01:30 PM BESS Jimenez   Orthopedic [address] [place] [address]     Signatures   Electronically signed by : Sharad Wilson MD; Dec  8 2016 10:04AM EST                       (Author)

## 2018-01-23 NOTE — CONSULTS
Chief Complaint  Patient is here for surgical clearance for right hip replacement by Dr Jonathon Limon  History of Present Illness  Pre-Op Visit (Brief): The patient is being seen for a preoperative visit  The procedure is a(n) Right hip arthroplasty scheduled for 12/19/16 with Dr Jonathon Limon  The indication for surgery is Hip OA  Surgical Risk Assessment:   Prior Anesthesia: She had prior anesthesia and no prior adverse reaction to general anesthesia  Pertinent Past Medical History: asthma, but no CAD, no chronic liver disease, no pulmonary embolism, no DVT, does not use anticoagulants, no diabetes, does not use insulin, no thyroid disease, no seizure disorder, no CVA and no renal disease  Exercise Capacity: able to walk two flights of stairs without symptoms  Lifestyle Factors: denies tobacco use  Symptoms: no symptoms  Pertinent Family History: no pertinent family history  Living Situation: home is secure and supportive  HPI: She is here for pre-op clearance for R hip replacement  She has no acute complaints  Pre op testing was reviewed and was all normal       Review of Systems    Constitutional: No fever, no chills, feels well, no tiredness, no recent weight gain or weight loss  Eyes: No complaints of eye pain, no red eyes, no eyesight problems, no discharge, no dry eyes, no itching of eyes  ENT: no complaints of earache, no loss of hearing, no nose bleeds, no nasal discharge, no sore throat, no hoarseness  Cardiovascular: No complaints of slow heart rate, no fast heart rate, no chest pain, no palpitations, no leg claudication, no lower extremity edema  Respiratory: No complaints of shortness of breath, no wheezing, no cough, no SOB on exertion, no orthopnea, no PND  Gastrointestinal: No complaints of abdominal pain, no constipation, no nausea or vomiting, no diarrhea, no bloody stools     Genitourinary: No complaints of dysuria, no incontinence, no pelvic pain, no dysmenorrhea, no vaginal discharge or bleeding  Musculoskeletal: arthralgias  Integumentary: No complaints of skin rash or lesions, no itching, no skin wounds, no breast pain or lump  Neurological: No complaints of headache, no confusion, no convulsions, no numbness, no dizziness or fainting, no tingling, no limb weakness, no difficulty walking  Psychiatric: Not suicidal, no sleep disturbance, no anxiety or depression, no change in personality, no emotional problems  Endocrine: No complaints of proptosis, no hot flashes, no muscle weakness, no deepening of the voice, no feelings of weakness  Hematologic/Lymphatic: No complaints of swollen glands, no swollen glands in the neck, does not bleed easily, does not bruise easily  Active Problems    1  Abnormal blood sugar (790 29) (R73 09)   2  Abnormal mammogram (793 80) (R92 8)   3  Arthritis of back (721 90) (M47 9)   4  Arthritis of knee, left (716 96) (M19 90)   5  Arthritis of right hip (716 95) (M19 90)   6  Encounter for screening for osteoporosis (V82 81) (Z13 820)   7  Enlarged thyroid (240 9) (E04 9)   8  Knee crepitus, right (719 66) (M23 91)   9  Leg muscle spasm (728 85) (M62 838)   10  Neck arthritis (721 0) (M46 92)   11  Pap smear for cervical cancer screening (V76 2) (Z12 4)   12  Right hip pain (719 45) (M25 551)   13  Scoliosis (737 30) (M41 9)   14  Thyroid nodule (241 0) (E04 1)   15  Vertigo, benign paroxysmal (386 11) (H81 10)   16  Visit for screening mammogram (V76 12) (Z12 31)    Past Medical History    · History of Acute meniscal tear of left knee (836 2) (B83 618Z)   · Acute upper respiratory infection (465 9) (J06 9)    The active problems and past medical history were reviewed and updated today        Surgical History    · History of Appendectomy   · History of Arthroscopy Knee Left   · History of Biopsy Thyroid Using Percutaneous Core Needle   · History of Cardiovascular Stress Test   · History of  Section   · History of Knee Surgery   · History of Tonsillectomy    The surgical history was reviewed and updated today  Family History    · Family history of cardiac disorder (V17 49) (Z82 49)   · Family history of hypertension (V17 49) (Z82 49)    · Family history of cardiac disorder (V17 49) (Z82 49)   · Family history of hypertension (V17 49) (Z82 49)    The family history was reviewed and updated today  Social History    · Former smoker (K95 85) (U51 914)  The social history was reviewed and updated today  Current Meds   1  Aleve 220 MG Oral Tablet; Therapy: (Recorded:05May2016) to Recorded   2  Cyclobenzaprine HCl - 10 MG Oral Tablet; TAKE 1 TABLET Every 8 hours PRN muscle   spasms; Therapy: 40LFF8114 to (Keren Feng)  Requested for: 11TNN8486; Last   Rx:10Nov2016 Ordered   3  Meclizine HCl - 25 MG Oral Tablet; TAKE 1 TABLET 3 TIMES DAILY AS NEEDED    Requested for: 94JEO2459; Last Rx:10Nov2016 Ordered   4  Multi-Vitamin TABS; Therapy: (Recorded:05May2016) to Recorded   5  Ventolin  (90 Base) MCG/ACT Inhalation Aerosol Solution; Therapy: (Recorded:05May2016) to Recorded    The medication list was reviewed and updated today  Allergies    1  No Known Drug Allergies    Vitals  Signs    Temperature: 98 1 F  Heart Rate: 234  Systolic: 400  Diastolic: 92  Height: 5 ft 8 in  Weight: 197 lb   BMI Calculated: 29 95  BSA Calculated: 2 04  O2 Saturation: 94    Physical Exam    Constitutional   General appearance: No acute distress, well appearing and well nourished  Eyes   Conjunctiva and lids: No swelling, erythema or discharge  Pupils and irises: Equal, round, reactive to light  Ears, Nose, Mouth, and Throat   Oropharynx: Normal with no erythema, edema, exudate or lesions  Neck   Thyroid: Abnormal   enlarged, R sided nodule  Pulmonary   Respiratory effort: No increased work of breathing or signs of respiratory distress  Auscultation of lungs: Clear to auscultation      Cardiovascular   Auscultation of heart: Normal rate and rhythm, normal S1 and S2, no murmurs  Examination of extremities for edema and/or varicosities: Normal     Musculoskeletal   Gait and station: Normal     Skin   Skin and subcutaneous tissue: Normal without rashes or lesions  Palpation of skin and subcutaneous tissue: Normal turgor  Psychiatric   Judgment and insight: Normal     Orientation to person, place, and time: Normal     Recent and remote memory: Intact  Mood and affect: Normal     Right Hip: Special Tests: positive JONATHAN test       Results/Data  (1) CBC/PLT/DIFF 90QJJ5209 12:18PM Ronelle Plants Order Number: [id number]     Test Name Result Flag Reference   WBC COUNT 6 30 Thousand/uL  4 31-10 16   RBC COUNT 4 51 Million/uL  3 81-5 12   HEMOGLOBIN 14 8 g/dL  11 5-15 4   HEMATOCRIT 43 7 %  34 8-46  1   MCV 97 fL  82-98   MCH 32 8 pg  26 8-34 3   MCHC 33 9 g/dL  31 4-37 4   RDW 12 6 %  11 6-15 1   MPV 9 9 fL  8 9-12 7   PLATELET COUNT 796 Thousands/uL  149-390   nRBC AUTOMATED 0 /100 WBCs     NEUTROPHILS RELATIVE PERCENT 58 %  43-75   LYMPHOCYTES RELATIVE PERCENT 32 %  14-44   MONOCYTES RELATIVE PERCENT 7 %  4-12   EOSINOPHILS RELATIVE PERCENT 2 %  0-6   BASOPHILS RELATIVE PERCENT 1 %  0-1   NEUTROPHILS ABSOLUTE COUNT 3 70 Thousands/?L  1 85-7 62   LYMPHOCYTES ABSOLUTE COUNT 1 99 Thousands/?L  0 60-4 47   MONOCYTES ABSOLUTE COUNT 0 41 Thousand/?L  0 17-1 22   EOSINOPHILS ABSOLUTE COUNT 0 15 Thousand/?L  0 00-0 61   BASOPHILS ABSOLUTE COUNT 0 04 Thousands/?L  0 00-0 10   - Patient Instructions: This bloodwork is non-fasting  Please drink two glasses of water morning of bloodwork  - Patient Instructions: This bloodwork is non-fasting  Please drink two glasses of water morning of bloodwork       (1) COMPREHENSIVE METABOLIC PANEL 61GRE8819 92:18PI Ronelle Plants Order Number: [id number]     Test Name Result Flag Reference   GLUCOSE,RANDM 80 mg/dL     If the patient is fasting, the ADA then defines impaired fasting glucose as > 100 mg/dL and diabetes as > or equal to 123 mg/dL  SODIUM 139 mmol/L  136-145   POTASSIUM 4 1 mmol/L  3 5-5 3   CHLORIDE 106 mmol/L  100-108   CARBON DIOXIDE 27 mmol/L  21-32   ANION GAP (CALC) 6 mmol/L  4-13   BLOOD UREA NITROGEN 19 mg/dL  5-25   CREATININE 0 98 mg/dL  0 60-1 30   Standardized to IDMS reference method   CALCIUM 9 2 mg/dL  8 3-10 1   BILI, TOTAL 0 69 mg/dL  0 20-1 00   ALK PHOSPHATAS 79 U/L     ALT (SGPT) 29 U/L  12-78   AST(SGOT) 15 U/L  5-45   ALBUMIN 4 3 g/dL  3 5-5 0   TOTAL PROTEIN 7 0 g/dL  6 4-8 2   eGFR Non-African American 57 3 ml/min/1 73sq m     - Patient Instructions: This bloodwork is non-fasting  Please drink two glasses of water morning of bloodwork  National Kidney Disease Education Program recommendations are as follows:  GFR calculation is accurate only with a steady state creatinine  Chronic Kidney disease less than 60 ml/min/1 73 sq  meters  Kidney failure less than 15 ml/min/1 73 sq  meters  (1) TYPE & SCREEN 27JHC3463 12:18PM Amada Pellet Order Number: [id number]     Test Name Result Flag Reference   ABO GROUPING A     RH FACTOR Positive     ANTIBODY SCREEN Negative       (1) HEMOGLOBIN A1C 22NRP8466 12:18PM Amada Pellet Order Number: [id number]     Test Name Result Flag Reference   HEMOGLOBIN A1C 5 2 %  4 2-6 3   EST  AVG  GLUCOSE 103 mg/dl       (1) TSH 06LXE9897 12:18PM [employer] Pellet Order Number: [id number]     Test Name Result Flag Reference   TSH 1 740 uIU/mL  0 358-3 740   - Patient Instructions: This bloodwork is non-fasting  Please drink two glasses of water morning of bloodwork  - Patient Instructions: This bloodwork is non-fasting  Please drink two glasses of water morning of bloodwork  Patients undergoing fluorescein dye angiography may retain small amounts of fluorescein in the body for 48-72 hours post procedure   Samples containing fluorescein can produce falsely depressed TSH values  If the patient had this procedure,a specimen should be resubmitted post fluorescein clearance  The recommended reference ranges for TSH during pregnancy are as follows:  First trimester 0 1 to 2 5 uIU/mL  Second trimester  0 2 to 3 0 uIU/mL  Third trimester 0 3 to 3 0 uIU/m     Assessment    1  Preop examination (V72 84) (Z01 818)   2  Arthritis of right hip (716 95) (M19 90)   3  Need for pneumococcal vaccination (V03 82) (Z23)    Discussion/Summary  Surgical Clearance: She is at a LOW risk from a cardiovascular standpoint at this time without any additional cardiac testing  Reevaluation needed, if she should present with symptoms prior to surgery/procedure  Surgical clearance faxed to       Medically jojo for surgery  Low CV risk  Pneumococcal vaccine today  Possible side effects of new medications were reviewed with the patient/guardian today  The treatment plan was reviewed with the patient/guardian  The patient/guardian understands and agrees with the treatment plan      End of Encounter Meds    1  Cyclobenzaprine HCl - 10 MG Oral Tablet; TAKE 1 TABLET Every 8 hours PRN muscle   spasms; Therapy: 92LRY7690 to (Lou Church)  Requested for: 54XDJ6337; Last   Rx:10Nov2016 Ordered    2  Meclizine HCl - 25 MG Oral Tablet; TAKE 1 TABLET 3 TIMES DAILY AS NEEDED    Requested for: 00BMO7694; Last Rx:10Nov2016 Ordered    3  Aleve 220 MG Oral Tablet; Therapy: (Recorded:50Dcd3182) to Recorded   4  Multi-Vitamin TABS; Therapy: (Recorded:57Fyg1250) to Recorded   5  Ventolin  (90 Base) MCG/ACT Inhalation Aerosol Solution;    Therapy: (Recorded:11Ihx1534) to Recorded    Signatures   Electronically signed by : Catie Ortiz MD; Dec  8 2016 10:04AM EST                       (Author)

## 2018-01-24 NOTE — PROGRESS NOTES
Assessment    1  Arthritis of knee, left (716 96) (M19 90)   2  Scoliosis (737 30) (M41 9)   3  Right hip pain (719 45) (M25 551)   4  Leg muscle spasm (728 85) (M62 838)   5  Acute upper respiratory infection (465 9) (J06 9)    Plan  Arthritis of knee, left    · * XR KNEE 3 VIEW LEFT; Status:Active; Requested for:[id number];    · 1 - Laura LAW, Piero Ace  (Orthopedic Surgery) Physician Referral  Consult  Status: Active   Requested for: [id number]  Care Summary provided  : Yes  Encounter for screening for osteoporosis    · * DXA BONE DENSITY SPINE HIP AND PELVIS; Status:Hold For -  Scheduling,Retrospective Authorization; Requested for:[id number];   Leg muscle spasm    · Cyclobenzaprine HCl - 10 MG Oral Tablet; TAKE 1 TABLET Every 8 hours PRN  muscle spasms  Right hip pain    · * XR HIP 2+ VIEW RIGHT; Status:Active; Requested for:[id number];     Discussion/Summary  Discussion Summary:   1  L knee pain - will get Xray and refer to orthopedics  Take Naproxen as needed  2  R thigh pain - suspect hip related, will get Xray and refer to ortho  3  URI - resolving, supportive care  4  Muscle spasms - Cyclobenzaprine as needed  5  Referral for mammogram, DEXA, and to Gyn  Will get previous records  Medication SE Review and Pt Understands Tx: Possible side effects of new medications were reviewed with the patient/guardian today  The treatment plan was reviewed with the patient/guardian  The patient/guardian understands and agrees with the treatment plan      Chief Complaint  Chief Complaint Free Text Note Form: Patient is here for right thigh tightness, left knee pain and cold symptoms  She also has scoliosis  History of Present Illness  HPI: 55 yo F here as a new patient recently moved back to the area from [place]  She has a history of scoliosis and arthritis in the knee  She reports having pain in the upper R leg, tightness in the muscles, and pain in the groin area   She also has L knee pain which has been going on for years - she previously had a meniscal injury and had arthroscopic surgery many years ago when she was in her 35s  She reports having more pain and "grinding' sensation in the L knee over the past year  She has gained some weight  Taking Naproxen on occasion  She gets a lot of muscle spasms in her legs and previously took a muscle relaxer which did help  She would like a renewal on this  Also having URI symptoms  Congestion and cough  Symptoms started 1 5 weeks ago  She was taking Anita [place] plus  She is improving  She is due for DEXA, Mammogram    Her previous records are not available today  Review of Systems  Complete-Female:   Constitutional: No fever, no chills, feels well, no tiredness, no recent weight gain or weight loss  Cardiovascular: No complaints of slow heart rate, no fast heart rate, no chest pain, no palpitations, no leg claudication, no lower extremity edema  Respiratory: No complaints of shortness of breath, no wheezing, no cough, no SOB on exertion, no orthopnea, no PND  Gastrointestinal: No complaints of abdominal pain, no constipation, no nausea or vomiting, no diarrhea, no bloody stools  Musculoskeletal: as noted in HPI  Integumentary: No complaints of skin rash or lesions, no itching, no skin wounds, no breast pain or lump  Neurological: No complaints of headache, no confusion, no convulsions, no numbness, no dizziness or fainting, no tingling, no limb weakness, no difficulty walking  Psychiatric: Not suicidal, no sleep disturbance, no anxiety or depression, no change in personality, no emotional problems  Past Medical History    1  History of Acute meniscal tear of left knee (836 2) (O19 007J)  Active Problems And Past Medical History Reviewed: The active problems and past medical history were reviewed and updated today  Surgical History    1  History of Appendectomy   2  History of Arthroscopy Knee Left   3   History of Biopsy Thyroid Using Percutaneous Core Needle   4  History of Cardiovascular Stress Test   5  History of  Section   6  History of Knee Surgery   7  History of Tonsillectomy  Surgical History Reviewed: The surgical history was reviewed and updated today  Family History  Mother    1  Family history of cardiac disorder (V17 49) (Z82 49)   2  Family history of hypertension (V17 49) (Z82 49)  Father    3  Family history of cardiac disorder (V17 49) (Z82 49)   4  Family history of hypertension (V17 49) (Z82 49)  Family History Reviewed: The family history was reviewed and updated today  Social History    · Former smoker (C39 47) (F30 612)  Social History Reviewed: The social history was reviewed and updated today  Current Meds   1  Aleve 220 MG Oral Tablet; Therapy: (Recorded:2016) to Recorded   2  Claritin-D 12 Hour 5-120 MG Oral Tablet Extended Release 12 Hour; Therapy: (Recorded:2016) to Recorded   3  Multi-Vitamin TABS; Therapy: (Recorded:2016) to Recorded   4  Ventolin  (90 Base) MCG/ACT Inhalation Aerosol Solution; Therapy: (Recorded:2016) to Recorded    Allergies    1  No Known Drug Allergies    Vitals  Vital Signs [Data Includes: Current Encounter]    Recorded: 54REW1013 02:29PM Recorded: 88QWS2991 01:54PM   Temperature  98 8 F   Heart Rate  76   Systolic 582 070   Diastolic 78 74   Height  5 ft 8 in   Weight  218 lb 9 6 oz   BMI Calculated  33 24   BSA Calculated  2 13   O2 Saturation  97     Physical Exam    Constitutional   General appearance: Abnormal   appears healthy and obese  Eyes   Conjunctiva and lids: No swelling, erythema or discharge  Pupils and irises: Equal, round and reactive to light  Ears, Nose, Mouth, and Throat   Otoscopic examination: Tympanic membranes translucent with normal light reflex  Canals patent without erythema  Oropharynx: Normal with no erythema, edema, exudate or lesions      Pulmonary   Respiratory effort: No increased work of breathing or signs of respiratory distress  Auscultation of lungs: Clear to auscultation  Cardiovascular   Auscultation of heart: Normal rate and rhythm, normal S1 and S2, without murmurs  Examination of extremities for edema and/or varicosities: Normal     Musculoskeletal   Gait and station: Normal     Skin   Skin and subcutaneous tissue: Normal without rashes or lesions  Psychiatric   Orientation to person, place, and time: Normal     Mood and affect: Normal      Right Knee: Appearance: no swelling  Palpatory findings include crepitus  Left Knee: Appearance: swelling  Palpatory findings include crepitus  Right Hip: Flexion: normal AROM  Extension: normal AROM  External rotation: painful restricted AROM  Abduction: painful restricted AROM   Special Tests: positive JONATHAN test       Signatures   Electronically signed by : Yumiko Guajardo MD; May  5 2016  2:35PM EST                       (Author)

## 2021-02-28 ENCOUNTER — IMMUNIZATIONS (OUTPATIENT)
Dept: FAMILY MEDICINE CLINIC | Facility: HOSPITAL | Age: 66
End: 2021-02-28

## 2021-02-28 DIAGNOSIS — Z23 ENCOUNTER FOR IMMUNIZATION: Primary | ICD-10-CM

## 2021-02-28 PROCEDURE — 91300 SARS-COV-2 / COVID-19 MRNA VACCINE (PFIZER-BIONTECH) 30 MCG: CPT

## 2021-02-28 PROCEDURE — 0001A SARS-COV-2 / COVID-19 MRNA VACCINE (PFIZER-BIONTECH) 30 MCG: CPT

## 2021-03-23 ENCOUNTER — IMMUNIZATIONS (OUTPATIENT)
Dept: FAMILY MEDICINE CLINIC | Facility: HOSPITAL | Age: 66
End: 2021-03-23

## 2021-03-23 DIAGNOSIS — Z23 ENCOUNTER FOR IMMUNIZATION: Primary | ICD-10-CM

## 2021-03-23 PROCEDURE — 91300 SARS-COV-2 / COVID-19 MRNA VACCINE (PFIZER-BIONTECH) 30 MCG: CPT

## 2021-03-23 PROCEDURE — 0002A SARS-COV-2 / COVID-19 MRNA VACCINE (PFIZER-BIONTECH) 30 MCG: CPT

## 2021-07-09 ENCOUNTER — OFFICE VISIT (OUTPATIENT)
Dept: FAMILY MEDICINE CLINIC | Facility: CLINIC | Age: 66
End: 2021-07-09
Payer: COMMERCIAL

## 2021-07-09 VITALS
OXYGEN SATURATION: 96 % | WEIGHT: 196 LBS | TEMPERATURE: 97.8 F | HEIGHT: 67 IN | HEART RATE: 75 BPM | SYSTOLIC BLOOD PRESSURE: 124 MMHG | BODY MASS INDEX: 30.76 KG/M2 | DIASTOLIC BLOOD PRESSURE: 76 MMHG

## 2021-07-09 DIAGNOSIS — Z12.11 SCREENING FOR COLON CANCER: ICD-10-CM

## 2021-07-09 DIAGNOSIS — Z76.89 ENCOUNTER TO ESTABLISH CARE: ICD-10-CM

## 2021-07-09 DIAGNOSIS — Z00.00 MEDICARE ANNUAL WELLNESS VISIT, INITIAL: Primary | ICD-10-CM

## 2021-07-09 DIAGNOSIS — L98.9 SKIN LESION OF BACK: ICD-10-CM

## 2021-07-09 PROCEDURE — G0438 PPPS, INITIAL VISIT: HCPCS | Performed by: FAMILY MEDICINE

## 2021-07-09 RX ORDER — OMEGA-3-ACID ETHYL ESTERS 1 G/1
2 CAPSULE, LIQUID FILLED ORAL DAILY
COMMUNITY

## 2021-07-09 NOTE — PROGRESS NOTES
Assessment and Plan:     Problem List Items Addressed This Visit        Musculoskeletal and Integument    Skin lesion of back    Relevant Orders    Ambulatory referral to Dermatology      Other Visit Diagnoses     Medicare annual wellness visit, initial    -  Primary    Screening for colon cancer        Relevant Orders    Cologuard    Encounter to establish care               Preventive health issues were discussed with patient, and age appropriate screening tests were ordered as noted in patient's After Visit Summary  Personalized health advice and appropriate referrals for health education or preventive services given if needed, as noted in patient's After Visit Summary  History of Present Illness:     Patient presents for Medicare Annual Wellness visit    Re-establishing care  Last seen 2016  Has skin lesion on back - wants to see derm    Patient Care Team:  Sonya Cruz MD as PCP - General (Family Medicine)  Jael Moreno MD    Review of Systems   Constitutional: Negative for activity change, appetite change, chills, fatigue, fever and unexpected weight change  HENT: Negative for congestion, ear discharge, ear pain, postnasal drip, sinus pressure and sore throat  Eyes: Negative for discharge and visual disturbance  Respiratory: Negative for cough, shortness of breath and wheezing  Cardiovascular: Negative for chest pain, palpitations and leg swelling  Gastrointestinal: Negative for abdominal pain, constipation, diarrhea, nausea and vomiting  Endocrine: Negative for cold intolerance, heat intolerance, polydipsia and polyuria  Genitourinary: Negative for difficulty urinating and frequency  Musculoskeletal: Positive for arthralgias  Negative for back pain, joint swelling and myalgias  Skin: Negative for rash  Lesion on back   Neurological: Negative for dizziness, weakness, light-headedness, numbness and headaches  Hematological: Negative for adenopathy  Psychiatric/Behavioral: Negative for behavioral problems, confusion, dysphoric mood, sleep disturbance and suicidal ideas  The patient is not nervous/anxious  Problem List:     Patient Active Problem List   Diagnosis    Status post total replacement of right hip    Skin lesion of back      Past Medical and Surgical History:     Past Medical History:   Diagnosis Date    Arthritis     Asthma     Enlarged thyroid     Right hip pain     Scoliosis     Vertigo      Past Surgical History:   Procedure Laterality Date    APPENDECTOMY       SECTION      CT TOTAL HIP ARTHROPLASTY Right 2016    Procedure: TOTAL HIP ARTHROPLASTY;  Surgeon: Coleen Almendaerz MD;  Location: [facility];  Service: Orthopedics      Family History:     Family History   Problem Relation Age of Onset    Heart disease Mother     Heart disease Father       Social History:     Social History     Socioeconomic History    Marital status: Single     Spouse name: None    Number of children: None    Years of education: None    Highest education level: None   Occupational History    None   Tobacco Use    Smoking status: Former Smoker     Packs/day: 1 00     Years: 17 00     Pack years: 17 00     Types: Cigarettes     Quit date:      Years since quittin 5    Smokeless tobacco: Never Used   Substance and Sexual Activity    Alcohol use: No    Drug use: No    Sexual activity: None   Other Topics Concern    None   Social History Narrative    None     Social Determinants of Health     Financial Resource Strain:     Difficulty of Paying Living Expenses:    Food Insecurity:     Worried About Running Out of Food in the Last Year:     Ran Out of Food in the Last Year:    Transportation Needs:     Lack of Transportation (Medical):      Lack of Transportation (Non-Medical):    Physical Activity:     Days of Exercise per Week:     Minutes of Exercise per Session:    Stress:     Feeling of Stress :    Social Connections:     Frequency of Communication with Friends and Family:     Frequency of Social Gatherings with Friends and Family:     Attends Mandaen Services:     Active Member of Clubs or Organizations:     Attends Club or Organization Meetings:     Marital Status:    Intimate Partner Violence:     Fear of Current or Ex-Partner:     Emotionally Abused:     Physically Abused:     Sexually Abused:       Medications and Allergies:     Current Outpatient Medications   Medication Sig Dispense Refill    aspirin 81 MG tablet Take 81 mg by mouth daily      BIOTIN PO Take by mouth      BucAlfAspKGlucCouchParsUvaUrJu (WATER PILLS PO) Take by mouth      Calcium Carbonate-Vit D-Min (CALCIUM 1200 PO) Take by mouth      Cholecalciferol (VITAMIN D-3 PO) Take by mouth      LUTEIN PO Take by mouth      MAGNESIUM PO Take by mouth      meclizine (ANTIVERT) 25 mg tablet Take 25 mg by mouth 3 (three) times a day as needed        Multiple Vitamin (MULTI VITAMIN DAILY PO) Take by mouth      omega-3-acid ethyl esters (LOVAZA) 1 g capsule Take 2 g by mouth daily      POTASSIUM PO Take by mouth       No current facility-administered medications for this visit       No Known Allergies   Immunizations:     Immunization History   Administered Date(s) Administered    DTP 05/17/2007    DTaP 5 01/24/2012    HPV Quadrivalent 02/22/2012    Hep A, adult 10/25/2011    Hep B, adult 02/02/2012    Influenza, seasonal, injectable 08/19/2014    MMR 04/20/2012    Meningococcal, Unknown Serogroups 10/14/2011    Pneumococcal Polysaccharide PPV23 10/06/2009, 12/08/2016    SARS-CoV-2 / COVID-19 mRNA IM ([email or web address]) 02/28/2021, 03/23/2021    Varicella 03/13/2008    Zoster 10/06/2009, 05/05/2015      Health Maintenance:         Topic Date Due    Hepatitis C Screening  Never done    Colorectal Cancer Screening  Never done    MAMMOGRAM  11/18/2021         Topic Date Due    Influenza Vaccine (1) 09/01/2021      Medicare Health Risk Assessment:     /76   Pulse 75   Temp 97 8 °F (36 6 °C)   Ht 5' 7" (1 702 m)   Wt 88 9 kg (196 lb)   SpO2 96%   BMI 30 70 kg/m²      Pedrito Frias is here for her Initial Wellness visit  Health Risk Assessment:   Patient rates overall health as very good  Patient feels that their physical health rating is slightly better  Patient is satisfied with their life  Eyesight was rated as same  Hearing was rated as same  Patient feels that their emotional and mental health rating is same  Patients states they are never, rarely angry  Patient states they are never, rarely unusually tired/fatigued  Pain experienced in the last 7 days has been none  Patient states that she has experienced no weight loss or gain in last 6 months  Depression Screening:   PHQ-2 Score: 0      Fall Risk Screening: In the past year, patient has experienced: no history of falling in past year      Urinary Incontinence Screening:   Patient has not leaked urine accidently in the last six months  Home Safety:  Patient does not have trouble with stairs inside or outside of their home  Patient has working smoke alarms and has working carbon monoxide detector  Home safety hazards include: none  Nutrition:   Current diet is Regular  Medications:   Patient is currently taking over-the-counter supplements  OTC medications include: see medication list  Patient is able to manage medications  Activities of Daily Living (ADLs)/Instrumental Activities of Daily Living (IADLs):   Walk and transfer into and out of bed and chair?: Yes  Dress and groom yourself?: Yes    Bathe or shower yourself?: Yes    Feed yourself? Yes  Do your laundry/housekeeping?: Yes  Manage your money, pay your bills and track your expenses?: Yes  Make your own meals?: Yes    Do your own shopping?: Yes    Previous Hospitalizations:   Any hospitalizations or ED visits within the last 12 months?: No      Advance Care Planning:   Living will:  Yes Advanced directive: Yes      Cognitive Screening:   Provider or family/friend/caregiver concerned regarding cognition?: No    PREVENTIVE SCREENINGS      Cardiovascular Screening:    General: Risks and Benefits Discussed and Patient Declines      Diabetes Screening:     General: Risks and Benefits Discussed and Patient Declines      Colorectal Cancer Screening:     General: Risks and Benefits Discussed    Due for: Cologuard      Breast Cancer Screening:     General: Screening Current and Risks and Benefits Discussed      Cervical Cancer Screening:    General: Screening Not Indicated      Osteoporosis Screening:    General: Risks and Benefits Discussed and Screening Current      Abdominal Aortic Aneurysm (AAA) Screening:        General: Risks and Benefits Discussed and Screening Not Indicated      Lung Cancer Screening:     General: Screening Not Indicated      Hepatitis C Screening:    General: Risks and Benefits Discussed and Screening Current    Screening, Brief Intervention, and Referral to Treatment (SBIRT)    Screening  Typical number of drinks in a day: 0  Typical number of drinks in a week: 0  Interpretation: Low risk drinking behavior  Single Item Drug Screening:  How often have you used an illegal drug (including marijuana) or a prescription medication for non-medical reasons in the past year? never    Single Item Drug Screen Score: 0  Interpretation: Negative screen for possible drug use disorder    Brief Intervention  Alcohol & drug use screenings were reviewed  No concerns regarding substance use disorder identified  Physical Exam  Constitutional:       General: She is not in acute distress  Appearance: Normal appearance  She is well-developed  She is not ill-appearing, toxic-appearing or diaphoretic  HENT:      Head: Normocephalic and atraumatic  Right Ear: External ear normal       Left Ear: External ear normal       Mouth/Throat:      Pharynx: No oropharyngeal exudate     Eyes: General: No scleral icterus  Right eye: No discharge  Left eye: No discharge  Conjunctiva/sclera: Conjunctivae normal       Pupils: Pupils are equal, round, and reactive to light  Neck:      Thyroid: No thyromegaly  Cardiovascular:      Rate and Rhythm: Normal rate and regular rhythm  Heart sounds: Normal heart sounds  No murmur heard  No friction rub  No gallop  Pulmonary:      Effort: Pulmonary effort is normal  No respiratory distress  Breath sounds: Normal breath sounds  No wheezing or rales  Chest:      Chest wall: No tenderness  Abdominal:      General: Bowel sounds are normal  There is no distension  Palpations: Abdomen is soft  There is no mass  Tenderness: There is no abdominal tenderness  There is no guarding or rebound  Hernia: No hernia is present  Musculoskeletal:      Left knee: Swelling and deformity present  Comments: Varus deformity of knees   Lymphadenopathy:      Cervical: No cervical adenopathy  Skin:     General: Skin is warm  Findings: No rash  Neurological:      Mental Status: She is alert and oriented to person, place, and time  Cranial Nerves: No cranial nerve deficit  Psychiatric:         Mood and Affect: Mood normal          Behavior: Behavior normal          Thought Content: Thought content normal          Judgment: Judgment normal        BMI Counseling: Body mass index is 30 7 kg/m²  The BMI is above normal  Nutrition recommendations include 3-5 servings of fruits/vegetables daily      Filiberto Montaño MD

## 2021-07-09 NOTE — PATIENT INSTRUCTIONS
Medicare Preventive Visit Patient Instructions  Thank you for completing your Welcome to Medicare Visit or Medicare Annual Wellness Visit today  Your next wellness visit will be due in one year (7/10/2022)  The screening/preventive services that you may require over the next 5-10 years are detailed below  Some tests may not apply to you based off risk factors and/or age  Screening tests ordered at today's visit but not completed yet may show as past due  Also, please note that scanned in results may not display below  Preventive Screenings:  Service Recommendations Previous Testing/Comments   Colorectal Cancer Screening  * Colonoscopy    * Fecal Occult Blood Test (FOBT)/Fecal Immunochemical Test (FIT)  * Fecal DNA/Cologuard Test  * Flexible Sigmoidoscopy Age: 54-65 years old   Colonoscopy: every 10 years (may be performed more frequently if at higher risk)  OR  FOBT/FIT: every 1 year  OR  Cologuard: every 3 years  OR  Sigmoidoscopy: every 5 years  Screening may be recommended earlier than age 48 if at higher risk for colorectal cancer  Also, an individualized decision between you and your healthcare provider will decide whether screening between the ages of 74-80 would be appropriate  Colonoscopy: Not on file  FOBT/FIT: Not on file  Cologuard: Not on file  Sigmoidoscopy: Not on file          Breast Cancer Screening Age: 36 years old  Frequency: every 1-2 years  Not required if history of left and right mastectomy Mammogram: 11/18/2020    Screening Current   Cervical Cancer Screening Between the ages of 21-29, pap smear recommended once every 3 years  Between the ages of 33-67, can perform pap smear with HPV co-testing every 5 years     Recommendations may differ for women with a history of total hysterectomy, cervical cancer, or abnormal pap smears in past  Pap Smear: Not on file    Screening Not Indicated   Hepatitis C Screening Once for adults born between 1945 and 1965  More frequently in patients at high risk for Hepatitis C Hep C Antibody: Not on file        Diabetes Screening 1-2 times per year if you're at risk for diabetes or have pre-diabetes Fasting glucose: No results in last 5 years   A1C: 5 2 %        Cholesterol Screening Once every 5 years if you don't have a lipid disorder  May order more often based on risk factors  Lipid panel: Not on file          Other Preventive Screenings Covered by Medicare:  1  Abdominal Aortic Aneurysm (AAA) Screening: covered once if your at risk  You're considered to be at risk if you have a family history of AAA  2  Lung Cancer Screening: covers low dose CT scan once per year if you meet all of the following conditions: (1) Age 50-69; (2) No signs or symptoms of lung cancer; (3) Current smoker or have quit smoking within the last 15 years; (4) You have a tobacco smoking history of at least 30 pack years (packs per day multiplied by number of years you smoked); (5) You get a written order from a healthcare provider  3  Glaucoma Screening: covered annually if you're considered high risk: (1) You have diabetes OR (2) Family history of glaucoma OR (3)  aged 48 and older OR (3)  American aged 72 and older  3  Osteoporosis Screening: covered every 2 years if you meet one of the following conditions: (1) You're estrogen deficient and at risk for osteoporosis based off medical history and other findings; (2) Have a vertebral abnormality; (3) On glucocorticoid therapy for more than 3 months; (4) Have primary hyperparathyroidism; (5) On osteoporosis medications and need to assess response to drug therapy  · Last bone density test (DXA Scan): 09/29/2016   5  HIV Screening: covered annually if you're between the age of 15-65  Also covered annually if you are younger than 13 and older than 72 with risk factors for HIV infection  For pregnant patients, it is covered up to 3 times per pregnancy      Immunizations:  Immunization Recommendations   Influenza Vaccine Annual influenza vaccination during flu season is recommended for all persons aged >= 6 months who do not have contraindications   Pneumococcal Vaccine (Prevnar and Pneumovax)  * Prevnar = PCV13  * Pneumovax = PPSV23   Adults 25-60 years old: 1-3 doses may be recommended based on certain risk factors  Adults 72 years old: Prevnar (PCV13) vaccine recommended followed by Pneumovax (PPSV23) vaccine  If already received PPSV23 since turning 65, then PCV13 recommended at least one year after PPSV23 dose  Hepatitis B Vaccine 3 dose series if at intermediate or high risk (ex: diabetes, end stage renal disease, liver disease)   Tetanus (Td) Vaccine - COST NOT COVERED BY MEDICARE PART B Following completion of primary series, a booster dose should be given every 10 years to maintain immunity against tetanus  Td may also be given as tetanus wound prophylaxis  Tdap Vaccine - COST NOT COVERED BY MEDICARE PART B Recommended at least once for all adults  For pregnant patients, recommended with each pregnancy  Shingles Vaccine (Shingrix) - COST NOT COVERED BY MEDICARE PART B  2 shot series recommended in those aged 48 and above     Health Maintenance Due:      Topic Date Due    Hepatitis C Screening  Never done    Colorectal Cancer Screening  Never done    MAMMOGRAM  11/18/2021     Immunizations Due:      Topic Date Due    Influenza Vaccine (1) 09/01/2021     Advance Directives   What are advance directives? Advance directives are legal documents that state your wishes and plans for medical care  These plans are made ahead of time in case you lose your ability to make decisions for yourself  Advance directives can apply to any medical decision, such as the treatments you want, and if you want to donate organs  What are the types of advance directives? There are many types of advance directives, and each state has rules about how to use them  You may choose a combination of any of the following:  · Living will:   This is a written record of the treatment you want  You can also choose which treatments you do not want, which to limit, and which to stop at a certain time  This includes surgery, medicine, IV fluid, and tube feedings  · Durable power of  for healthcare [facility]): This is a written record that states who you want to make healthcare choices for you when you are unable to make them for yourself  This person, called a proxy, is usually a family member or a friend  You may choose more than 1 proxy  · Do not resuscitate (DNR) order:  A DNR order is used in case your heart stops beating or you stop breathing  It is a request not to have certain forms of treatment, such as CPR  A DNR order may be included in other types of advance directives  · Medical directive: This covers the care that you want if you are in a coma, near death, or unable to make decisions for yourself  You can list the treatments you want for each condition  Treatment may include pain medicine, surgery, blood transfusions, dialysis, IV or tube feedings, and a ventilator (breathing machine)  · Values history: This document has questions about your views, beliefs, and how you feel and think about life  This information can help others choose the care that you would choose  Why are advance directives important? An advance directive helps you control your care  Although spoken wishes may be used, it is better to have your wishes written down  Spoken wishes can be misunderstood, or not followed  Treatments may be given even if you do not want them  An advance directive may make it easier for your family to make difficult choices about your care  Weight Management   Why it is important to manage your weight:  Being overweight increases your risk of health conditions such as heart disease, high blood pressure, type 2 diabetes, and certain types of cancer   It can also increase your risk for osteoarthritis, sleep apnea, and other respiratory problems  Aim for a slow, steady weight loss  Even a small amount of weight loss can lower your risk of health problems  How to lose weight safely:  A safe and healthy way to lose weight is to eat fewer calories and get regular exercise  You can lose up about 1 pound a week by decreasing the number of calories you eat by 500 calories each day  Healthy meal plan for weight management:  A healthy meal plan includes a variety of foods, contains fewer calories, and helps you stay healthy  A healthy meal plan includes the following:  · Eat whole-grain foods more often  A healthy meal plan should contain fiber  Fiber is the part of grains, fruits, and vegetables that is not broken down by your body  Whole-grain foods are healthy and provide extra fiber in your diet  Some examples of whole-grain foods are whole-wheat breads and pastas, oatmeal, brown rice, and bulgur  · Eat a variety of vegetables every day  Include dark, leafy greens such as spinach, kale, marycruz greens, and mustard greens  Eat yellow and orange vegetables such as carrots, sweet potatoes, and winter squash  · Eat a variety of fruits every day  Choose fresh or canned fruit (canned in its own juice or light syrup) instead of juice  Fruit juice has very little or no fiber  · Eat low-fat dairy foods  Drink fat-free (skim) milk or 1% milk  Eat fat-free yogurt and low-fat cottage cheese  Try low-fat cheeses such as mozzarella and other reduced-fat cheeses  · Choose meat and other protein foods that are low in fat  Choose beans or other legumes such as split peas or lentils  Choose fish, skinless poultry (chicken or turkey), or lean cuts of red meat (beef or pork)  Before you cook meat or poultry, cut off any visible fat  · Use less fat and oil  Try baking foods instead of frying them  Add less fat, such as margarine, sour cream, regular salad dressing and mayonnaise to foods  Eat fewer high-fat foods   Some examples of high-fat foods include french fries, doughnuts, ice cream, and cakes  · Eat fewer sweets  Limit foods and drinks that are high in sugar  This includes candy, cookies, regular soda, and sweetened drinks  Exercise:  Exercise at least 30 minutes per day on most days of the week  Some examples of exercise include walking, biking, dancing, and swimming  You can also fit in more physical activity by taking the stairs instead of the elevator or parking farther away from stores  Ask your healthcare provider about the best exercise plan for you  © Copyright [employer] 2018 Information is for End User's use only and may not be sold, redistributed or otherwise used for commercial purposes   All illustrations and images included in CareNotes® are the copyrighted property of [employer]  or [address] [place] [address]

## 2021-08-31 ENCOUNTER — CONSULT (OUTPATIENT)
Dept: DERMATOLOGY | Facility: CLINIC | Age: 66
End: 2021-08-31
Payer: COMMERCIAL

## 2021-08-31 VITALS — HEIGHT: 67 IN | WEIGHT: 192 LBS | BODY MASS INDEX: 30.13 KG/M2 | TEMPERATURE: 98.2 F

## 2021-08-31 DIAGNOSIS — D22.5 MULTIPLE BENIGN MELANOCYTIC NEVI OF UPPER AND LOWER EXTREMITIES AND TRUNK: Primary | ICD-10-CM

## 2021-08-31 DIAGNOSIS — D18.01 CHERRY ANGIOMA: ICD-10-CM

## 2021-08-31 DIAGNOSIS — L98.9 SKIN LESION OF BACK: ICD-10-CM

## 2021-08-31 DIAGNOSIS — D22.70 MULTIPLE BENIGN MELANOCYTIC NEVI OF UPPER AND LOWER EXTREMITIES AND TRUNK: Primary | ICD-10-CM

## 2021-08-31 DIAGNOSIS — L82.1 SEBORRHEIC KERATOSES: ICD-10-CM

## 2021-08-31 DIAGNOSIS — D22.60 MULTIPLE BENIGN MELANOCYTIC NEVI OF UPPER AND LOWER EXTREMITIES AND TRUNK: Primary | ICD-10-CM

## 2021-08-31 PROCEDURE — 99204 OFFICE O/P NEW MOD 45 MIN: CPT | Performed by: DERMATOLOGY

## 2021-08-31 NOTE — PATIENT INSTRUCTIONS
Assessment and Plan:  Based on a thorough discussion of this condition and the management approach to it (including a comprehensive discussion of the known risks, side effects and potential benefits of treatment), the patient (family) agrees to implement the following specific plan:   When outside we recommend using a wide brim hat, sunglasses, long sleeve and pants, sunscreen with SPF 45+ with reapplication every 2 hours, or SPF specific clothing    Benign, reassured   Annual skin check     Melanocytic Nevi  Melanocytic nevi ("moles") are tan or brown, raised or flat areas of the skin which have an increased number of melanocytes  Melanocytes are the cells in our body which make pigment and account for skin color  Some moles are present at birth (I e , "congenital nevi"), while others come up later in life (i e , "acquired nevi")  The sun can stimulate the body to make more moles  Sunburns are not the only thing that triggers more moles  Chronic sun exposure can do it too  Clinically distinguishing a healthy mole from melanoma may be difficult, even for experienced dermatologists  The "ABCDE's" of moles have been suggested as a means of helping to alert a person to a suspicious mole and the possible increased risk of melanoma  The suggestions for raising alert are as follows:    Asymmetry: Healthy moles tend to be symmetric, while melanomas are often asymmetric  Asymmetry means if you draw a line through the mole, the two halves do not match in color, size, shape, or surface texture  Asymmetry can be a result of rapid enlargement of a mole, the development of a raised area on a previously flat lesion, scaling, ulceration, bleeding or scabbing within the mole  Any mole that starts to demonstrate "asymmetry" should be examined promptly by a board certified dermatologist      Border: Healthy moles tend to have discrete, even borders    The border of a melanoma often blends into the normal skin and does not sharply delineate the mole from normal skin  Any mole that starts to demonstrate "uneven borders" should be examined promptly by a board certified dermatologist      Color: Healthy moles tend to be one color throughout  Melanomas tend to be made up of different colors ranging from dark black, blue, white, or red  Any mole that demonstrates a color change should be examined promptly by a board certified dermatologist      Diameter: Healthy moles tend to be smaller than 0 6 cm in size; an exception are "congenital nevi" that can be larger  Melanomas tend to grow and can often be greater than 0 6 cm (1/4 of an inch, or the size of a pencil eraser)  This is only a guideline, and many normal moles may be larger than 0 6 cm without being unhealthy  Any mole that starts to change in size (small to bigger or bigger to smaller) should be examined promptly by a board certified dermatologist      Evolving: Healthy moles tend to "stay the same "  Melanomas may often show signs of change or evolution such as a change in size, shape, color, or elevation  Any mole that starts to itch, bleed, crust, burn, hurt, or ulcerate or demonstrate a change or evolution should be examined promptly by a board certified dermatologist       Assessment and Plan:  Based on a thorough discussion of this condition and the management approach to it (including a comprehensive discussion of the known risks, side effects and potential benefits of treatment), the patient (family) agrees to implement the following specific plan:   When outside we recommend using a wide brim hat, sunglasses, long sleeve and pants, sunscreen with SPF 80+ with reapplication every 2 hours, or SPF specific clothing       What is a lentigo? A lentigo is a pigmented flat or slightly raised lesion with a clearly defined edge  Unlike an ephelis (freckle), it does not fade in the winter months  There are several kinds of lentigo    The name lentigo originally referred to its appearance resembling a small lentil  The plural of lentigo is lentigines, although lentigos is also in common use  Who gets lentigines? Lentigines can affect males and females of all ages and races  Solar lentigines are especially prevalent in fair skinned adults  Lentigines associated with syndromes are present at birth or arise during childhood  What causes lentigines? Common forms of lentigo are due to exposure to ultraviolet radiation:   Sun damage including sunburn    Indoor tanning    Phototherapy, especially photochemotherapy (PUVA)    Ionizing radiation, eg radiation therapy, can also cause lentigines  Several familial syndromes associated with widespread lentigines originate from mutations in Dewayne-MAP kinase, mTOR signaling and PTEN pathways  What is the treatment for lentigines? Most lentigines are left alone  Attempts to lighten them may not be successful  The following approaches are used:   SPF 50+ broad-spectrum sunscreen    Hydroquinone bleaching cream    Alpha hydroxy acids    Vitamin C    Retinoids    Azelaic acid    Chemical peels  Individual lesions can be permanently removed using:   Cryotherapy    Intense pulsed light    Pigment lasers    How can lentigines be prevented? Lentigines associated with exposure ultraviolet radiation can be prevented by very careful sun protection  Clothing is more successful at preventing new lentigines than are sunscreens  What is the outlook for lentigines? Lentigines usually persist  They may increase in number with age and sun exposure  Some in sun-protected sites may fade and disappear      Assessment and Plan:  Based on a thorough discussion of this condition and the management approach to it (including a comprehensive discussion of the known risks, side effects and potential benefits of treatment), the patient (family) agrees to implement the following specific plan:   Monitor for changes   Benign, reassured    Assessment and Plan:    Cherry angioma, also known as [employer] spots, are benign vascular skin lesions  A "cherry angioma" is a firm red, blue or purple papule, 0 1-1 cm in diameter  When thrombosed, they can appear black in colour until evaluated with a dermatoscope when the red or purple colour is more easily seen  Cherry angioma may develop on any part of the body but most often appear on the scalp, face, lips and trunk  An angioma is due to proliferating endothelial cells; these are the cells that line the inside of a blood vessel  Angiomas can arise in early life or later in life; the most common type of angioma is a cherry angioma  Cherry angiomas are very common in males and females of any age or race  They are more noticeable in white skin than in skin of colour  They markedly increase in number from about the age of 36  There may be a family history of similar lesions  Eruptive cherry angiomas have been rarely reported to be associated with internal malignancy  The cause of angiomas is unknown  Genetic analysis of cherry angiomas has shown that they frequently carry specific somatic missense mutations in the GNAQ and GNA11 (Q209H) genes, which are involved in other vascular and melanocytic proliferations  Assessment and Plan:  Based on a thorough discussion of this condition and the management approach to it (including a comprehensive discussion of the known risks, side effects and potential benefits of treatment), the patient (family) agrees to implement the following specific plan:   Monitor for changes   Benign, reassured    Seborrheic Keratosis  A seborrheic keratosis is a harmless warty spot that appears during adult life as a common sign of skin aging  Seborrheic keratoses can arise on any area of skin, covered or uncovered, with the usual exception of the palms and soles  They do not arise from mucous membranes  Seborrheic keratoses can have highly variable appearance        Seborrheic keratoses are extremely common  It has been estimated that over 90% of adults over the age of 61 years have one or more of them  They occur in males and females of all races, typically beginning to erupt in the 35s or 45s  They are uncommon under the age of 21 years  The precise cause of seborrhoeic keratoses is not known  Seborrhoeic keratoses are considered degenerative in nature  As time goes by, seborrheic keratoses tend to become more numerous  Some people inherit a tendency to develop a very large number of them; some people may have hundreds of them  There is no easy way to remove multiple lesions on a single occasion  Unless a specific lesion is "inflamed" and is causing pain or stinging/burning or is bleeding, most insurance companies do not authorize treatment

## 2021-08-31 NOTE — PROGRESS NOTES
[address] [facility] Note     Patient Name: Xiao Richards  Encounter Date: 08/31/2021     Have you been cared for by a [facility] Dermatologist in the last 3 years and, if so, which one? No    · Have you traveled outside of the [address] [place] [address] in the past 3 months or outside of the [facility] area in the last 2 weeks? No     May we call your Preferred Phone number to discuss your specific medical information? Yes     May we leave a detailed message that includes your specific medical information? Yes      Today's Chief Concerns:   Concern #1:  Full body exam     Past Medical History:  Have you personally ever had or currently have any of the following? · Skin cancer (such as Melanoma, Basal Cell Carcinoma, Squamous Cell Carcinoma? (If Yes, please provide more detail)- No  · Eczema: No  · Psoriasis: No  · HIV/AIDS: No  · Hepatitis B or C: No  · Tuberculosis: No  · Systemic Immunosuppression such as Diabetes, Biologic or Immunotherapy, Chemotherapy, Organ Transplantation, Bone Marrow Transplantation (If YES, please provide more detail): No  · Radiation Treatment (If YES, please provide more detail): No  · Any other major medical conditions/concerns? (If Yes, which types)- No    Social History:     What is/was your primary occupation?  What are your hobbies/past-times? Family History:  Have any of your "first degree relatives" (parent, brother, sister, or child) had any of the following       · Skin cancer such as Melanoma or Merkel Cell Carcinoma or Pancreatic Cancer? No  · Eczema, Asthma, Hay Fever or Seasonal Allergies: No  · Psoriasis or Psoriatic Arthritis: No  · Do any other medical conditions seem to run in your family? If Yes, what condition and which relatives?   No    Current Medications:         Current Outpatient Medications:     aspirin 81 MG tablet, Take 81 mg by mouth daily, Disp: , Rfl:     BIOTIN PO, Take by mouth, Disp: , Rfl:    BucAlfAspKGlucCouchParsUvaUrJu (WATER PILLS PO), Take by mouth, Disp: , Rfl:     Calcium Carbonate-Vit D-Min (CALCIUM 1200 PO), Take by mouth, Disp: , Rfl:     Cholecalciferol (VITAMIN D-3 PO), Take by mouth, Disp: , Rfl:     LUTEIN PO, Take by mouth, Disp: , Rfl:     MAGNESIUM PO, Take by mouth, Disp: , Rfl:     meclizine (ANTIVERT) 25 mg tablet, Take 25 mg by mouth 3 (three) times a day as needed  , Disp: , Rfl:     Multiple Vitamin (MULTI VITAMIN DAILY PO), Take by mouth, Disp: , Rfl:     omega-3-acid ethyl esters (LOVAZA) 1 g capsule, Take 2 g by mouth daily, Disp: , Rfl:     POTASSIUM PO, Take by mouth, Disp: , Rfl:       Review of Systems:  Have you recently had or currently have any of the following? If YES, what are you doing for the problem? · Fever, chills or unintended weight loss: No  · Sudden loss or change in your vision: No  · Nausea, vomiting or blood in your stool: No  · Painful or swollen joints: No  · Wheezing or cough: No  · Changing mole or non-healing wound: No  · Nosebleeds: No  · Excessive sweating: No  · Easy or prolonged bleeding? No  · Over the last 2 weeks, how often have you been bothered by the following problems? · Taking little interest or pleasure in doing things: 1 - Not at All  · Feeling down, depressed, or hopeless: 1 - Not at All  · Rapid heartbeat with epinephrine:  No    · FEMALES ONLY:    · Are you pregnant or planning to become pregnant? No  · Are you currently or planning to be nursing or breast feeding? No    · Any known allergies? · No Known Allergies      Physical Exam:     Was a chaperone (Derm Clinical Assistant) present throughout the entire Physical Exam? Yes     Did the Dermatology Team specifically  the patient on the importance of a Full Skin Exam to be sure that nothing is missed clinically?  Yes}  o Did the patient ultimately request or accept a Full Skin Exam?  Yes  o Did the patient specifically refuse to have the areas "under-the-bra" examined by the Dermatologist? No  o Did the patient specifically refuse to have the areas "under-the-underwear" examined by the Dermatologist? No    CONSTITUTIONAL:   Vitals:    08/31/21 1112   Temp: 98 2 °F (36 8 °C)   TempSrc: Tympanic   Weight: 87 1 kg (192 lb)   Height: 5' 7" (1 702 m)       PSYCH: Normal mood and affect  EYES: Normal conjunctiva  ENT: Normal lips and oral mucosa  CARDIOVASCULAR: No edema  RESPIRATORY: Normal respirations  HEME/LYMPH/IMMUNO:  No regional lymphadenopathy except as noted below in "ASSESSMENT AND PLAN BY DIAGNOSIS"    SKIN:  FULL ORGAN SYSTEM EXAM   Hair, Scalp, Ears, Face Normal except as noted below in Assessment   Neck, Cervical Chain Nodes Normal except as noted below in Assessment   Right Arm/Hand/Fingers Normal except as noted below in Assessment   Left Arm/Hand/Fingers Normal except as noted below in Assessment   Chest/Breasts/Axillae Viewed areas Normal except as noted below in Assessment   Abdomen, Umbilicus Normal except as noted below in Assessment   Back/Spine Normal except as noted below in Assessment   Groin/Genitalia/Buttocks Normal except as noted below in Assessment   Right Leg, Foot, Toes Normal except as noted below in Assessment   Left Leg, Foot, Toes Normal except as noted below in Assessment        Assessment and Plan by Diagnosis:    History of Present Condition:     Duration:  How long has this been an issue for you?    o  years    Location Affected:  Where on the body is this affecting you? o  trunk and extremities    Quality:  Is there any bleeding, pain, itch, burning/irritation, or redness associated with the skin lesion? o  denies    Severity:  Describe any bleeding, pain, itch, burning/irritation, or redness on a scale of 1 to 10 (with 10 being the worst)  o  denies    Timing:  Does this condition seem to be there pretty constantly or do you notice it more at specific times throughout the day?     o  constant    Context: Have you ever noticed that this condition seems to be associated with specific activities you do?    o  denies    Modifying Factors:    o Anything that seems to make the condition worse?    -  denies   o What have you tried to do to make the condition better?    -  denies    Associated Signs and Symptoms:  Does this skin lesion seem to be associated with any of the following:  o  denies        1  MELANOCYTIC NEVI ("Moles")    Physical Exam:   Anatomic Location Affected:   Mostly on sun-exposed areas of the trunk and extremities   Morphological Description:  Scattered, 1-4mm round to ovoid, symmetrical-appearing, even bordered, skin colored to dark brown macules/papules, mostly in sun-exposed areas   Pertinent Positives:   Pertinent Negatives:    Assessment and Plan:  Based on a thorough discussion of this condition and the management approach to it (including a comprehensive discussion of the known risks, side effects and potential benefits of treatment), the patient (family) agrees to implement the following specific plan:   When outside we recommend using a wide brim hat, sunglasses, long sleeve and pants, sunscreen with SPF 48+ with reapplication every 2 hours, or SPF specific clothing    Benign, reassured   Annual skin check     Melanocytic Nevi  Melanocytic nevi ("moles") are tan or brown, raised or flat areas of the skin which have an increased number of melanocytes  Melanocytes are the cells in our body which make pigment and account for skin color  Some moles are present at birth (I e , "congenital nevi"), while others come up later in life (i e , "acquired nevi")  The sun can stimulate the body to make more moles  Sunburns are not the only thing that triggers more moles  Chronic sun exposure can do it too  Clinically distinguishing a healthy mole from melanoma may be difficult, even for experienced dermatologists   The "ABCDE's" of moles have been suggested as a means of helping to alert a person to a suspicious mole and the possible increased risk of melanoma  The suggestions for raising alert are as follows:    Asymmetry: Healthy moles tend to be symmetric, while melanomas are often asymmetric  Asymmetry means if you draw a line through the mole, the two halves do not match in color, size, shape, or surface texture  Asymmetry can be a result of rapid enlargement of a mole, the development of a raised area on a previously flat lesion, scaling, ulceration, bleeding or scabbing within the mole  Any mole that starts to demonstrate "asymmetry" should be examined promptly by a board certified dermatologist      Border: Healthy moles tend to have discrete, even borders  The border of a melanoma often blends into the normal skin and does not sharply delineate the mole from normal skin  Any mole that starts to demonstrate "uneven borders" should be examined promptly by a board certified dermatologist      Color: Healthy moles tend to be one color throughout  Melanomas tend to be made up of different colors ranging from dark black, blue, white, or red  Any mole that demonstrates a color change should be examined promptly by a board certified dermatologist      Diameter: Healthy moles tend to be smaller than 0 6 cm in size; an exception are "congenital nevi" that can be larger  Melanomas tend to grow and can often be greater than 0 6 cm (1/4 of an inch, or the size of a pencil eraser)  This is only a guideline, and many normal moles may be larger than 0 6 cm without being unhealthy  Any mole that starts to change in size (small to bigger or bigger to smaller) should be examined promptly by a board certified dermatologist      Evolving: Healthy moles tend to "stay the same "  Melanomas may often show signs of change or evolution such as a change in size, shape, color, or elevation    Any mole that starts to itch, bleed, crust, burn, hurt, or ulcerate or demonstrate a change or evolution should be examined promptly by a board certified dermatologist         2  LENTIGO    Physical Exam:   Anatomic Location Affected:  Trunk    Morphological Description:  Light brown macules   Pertinent Positives:   Pertinent Negatives:     Assessment and Plan:  Based on a thorough discussion of this condition and the management approach to it (including a comprehensive discussion of the known risks, side effects and potential benefits of treatment), the patient (family) agrees to implement the following specific plan:   When outside we recommend using a wide brim hat, sunglasses, long sleeve and pants, sunscreen with SPF 18+ with reapplication every 2 hours, or SPF specific clothing       What is a lentigo? A lentigo is a pigmented flat or slightly raised lesion with a clearly defined edge  Unlike an ephelis (freckle), it does not fade in the winter months  There are several kinds of lentigo  The name lentigo originally referred to its appearance resembling a small lentil  The plural of lentigo is lentigines, although lentigos is also in common use  Who gets lentigines? Lentigines can affect males and females of all ages and races  Solar lentigines are especially prevalent in fair skinned adults  Lentigines associated with syndromes are present at birth or arise during childhood  What causes lentigines? Common forms of lentigo are due to exposure to ultraviolet radiation:   Sun damage including sunburn    Indoor tanning    Phototherapy, especially photochemotherapy (PUVA)    Ionizing radiation, eg radiation therapy, can also cause lentigines  Several familial syndromes associated with widespread lentigines originate from mutations in Dewayne-MAP kinase, mTOR signaling and PTEN pathways  What is the treatment for lentigines? Most lentigines are left alone  Attempts to lighten them may not be successful   The following approaches are used:   SPF 50+ broad-spectrum sunscreen    Hydroquinone bleaching cream  Alpha hydroxy acids    Vitamin C    Retinoids    Azelaic acid    Chemical peels  Individual lesions can be permanently removed using:   Cryotherapy    Intense pulsed light    Pigment lasers    How can lentigines be prevented? Lentigines associated with exposure ultraviolet radiation can be prevented by very careful sun protection  Clothing is more successful at preventing new lentigines than are sunscreens  What is the outlook for lentigines? Lentigines usually persist  They may increase in number with age and sun exposure  Some in sun-protected sites may fade and disappear  3  TREJO ANGIOMAS    Physical Exam:   Anatomic Location Affected:  trunk   Morphological Description:  Scattered cherry red, 1-4 mm papules   Pertinent Positives:   Pertinent Negatives:    Assessment and Plan:  Based on a thorough discussion of this condition and the management approach to it (including a comprehensive discussion of the known risks, side effects and potential benefits of treatment), the patient (family) agrees to implement the following specific plan:   Monitor for changes   Benign, reassured    Assessment and Plan:    Cherry angioma, also known as [employer] spots, are benign vascular skin lesions  A "cherry angioma" is a firm red, blue or purple papule, 0 1-1 cm in diameter  When thrombosed, they can appear black in colour until evaluated with a dermatoscope when the red or purple colour is more easily seen  Cherry angioma may develop on any part of the body but most often appear on the scalp, face, lips and trunk  An angioma is due to proliferating endothelial cells; these are the cells that line the inside of a blood vessel  Angiomas can arise in early life or later in life; the most common type of angioma is a cherry angioma  Cherry angiomas are very common in males and females of any age or race  They are more noticeable in white skin than in skin of colour   They markedly increase in number from about the age of 36  There may be a family history of similar lesions  Eruptive cherry angiomas have been rarely reported to be associated with internal malignancy  The cause of angiomas is unknown  Genetic analysis of cherry angiomas has shown that they frequently carry specific somatic missense mutations in the GNAQ and GNA11 (Q209H) genes, which are involved in other vascular and melanocytic proliferations  4  SEBORRHEIC KERATOSIS; NON-INFLAMED    Physical Exam:   Anatomic Location Affected:  trunk   Morphological Description:  Flat and raised, waxy, smooth to warty textured, yellow to brownish-grey to dark brown to blackish, discrete, "stuck-on" appearing papules   Pertinent Positives:   Pertinent Negatives:    Assessment and Plan:  Based on a thorough discussion of this condition and the management approach to it (including a comprehensive discussion of the known risks, side effects and potential benefits of treatment), the patient (family) agrees to implement the following specific plan:   Monitor for changes   Benign, reassured    Seborrheic Keratosis  A seborrheic keratosis is a harmless warty spot that appears during adult life as a common sign of skin aging  Seborrheic keratoses can arise on any area of skin, covered or uncovered, with the usual exception of the palms and soles  They do not arise from mucous membranes  Seborrheic keratoses can have highly variable appearance  Seborrheic keratoses are extremely common  It has been estimated that over 90% of adults over the age of 61 years have one or more of them  They occur in males and females of all races, typically beginning to erupt in the 35s or 45s  They are uncommon under the age of 21 years  The precise cause of seborrhoeic keratoses is not known  Seborrhoeic keratoses are considered degenerative in nature  As time goes by, seborrheic keratoses tend to become more numerous   Some people inherit a tendency to develop a very large number of them; some people may have hundreds of them  There is no easy way to remove multiple lesions on a single occasion  Unless a specific lesion is "inflamed" and is causing pain or stinging/burning or is bleeding, most insurance companies do not authorize treatment        Scribe Attestation    I,:  Brittanie Mejía MA am acting as a scribe while in the presence of the attending physician :       I,:  Kelsey Dean MD personally performed the services described in this documentation    as scribed in my presence :

## 2021-10-20 ENCOUNTER — IMMUNIZATIONS (OUTPATIENT)
Dept: FAMILY MEDICINE CLINIC | Facility: CLINIC | Age: 66
End: 2021-10-20
Payer: COMMERCIAL

## 2021-10-20 DIAGNOSIS — Z23 NEED FOR INFLUENZA VACCINATION: Primary | ICD-10-CM

## 2021-10-20 PROCEDURE — 90662 IIV NO PRSV INCREASED AG IM: CPT | Performed by: FAMILY MEDICINE

## 2021-10-20 PROCEDURE — G0008 ADMIN INFLUENZA VIRUS VAC: HCPCS | Performed by: FAMILY MEDICINE

## 2021-12-13 ENCOUNTER — TELEPHONE (OUTPATIENT)
Dept: FAMILY MEDICINE CLINIC | Facility: CLINIC | Age: 66
End: 2021-12-13

## 2021-12-13 DIAGNOSIS — Z11.59 SCREENING FOR VIRAL DISEASE: Primary | ICD-10-CM

## 2021-12-14 PROCEDURE — U0005 INFEC AGEN DETEC AMPLI PROBE: HCPCS | Performed by: FAMILY MEDICINE

## 2021-12-14 PROCEDURE — U0003 INFECTIOUS AGENT DETECTION BY NUCLEIC ACID (DNA OR RNA); SEVERE ACUTE RESPIRATORY SYNDROME CORONAVIRUS 2 (SARS-COV-2) (CORONAVIRUS DISEASE [COVID-19]), AMPLIFIED PROBE TECHNIQUE, MAKING USE OF HIGH THROUGHPUT TECHNOLOGIES AS DESCRIBED BY CMS-2020-01-R: HCPCS | Performed by: FAMILY MEDICINE

## 2021-12-15 LAB — SARS-COV-2 RNA RESP QL NAA+PROBE: POSITIVE

## 2021-12-16 ENCOUNTER — TELEMEDICINE (OUTPATIENT)
Dept: FAMILY MEDICINE CLINIC | Facility: CLINIC | Age: 66
End: 2021-12-16
Payer: COMMERCIAL

## 2021-12-16 VITALS — OXYGEN SATURATION: 96 % | TEMPERATURE: 98.2 F | HEART RATE: 98 BPM

## 2021-12-16 DIAGNOSIS — U07.1 COVID-19: Primary | ICD-10-CM

## 2021-12-16 PROBLEM — L98.9 SKIN LESION OF BACK: Status: RESOLVED | Noted: 2021-07-09 | Resolved: 2021-12-16

## 2021-12-16 PROCEDURE — 99213 OFFICE O/P EST LOW 20 MIN: CPT | Performed by: FAMILY MEDICINE

## 2021-12-16 PROCEDURE — 1036F TOBACCO NON-USER: CPT | Performed by: FAMILY MEDICINE

## 2021-12-16 PROCEDURE — 1160F RVW MEDS BY RX/DR IN RCRD: CPT | Performed by: FAMILY MEDICINE

## 2022-04-19 ENCOUNTER — TELEPHONE (OUTPATIENT)
Dept: FAMILY MEDICINE CLINIC | Facility: CLINIC | Age: 67
End: 2022-04-19

## 2022-04-19 NOTE — TELEPHONE ENCOUNTER
Patient scheduled her med well for July when she is due, she was asking if you would order labs prior to for her?

## 2022-07-07 ENCOUNTER — APPOINTMENT (OUTPATIENT)
Dept: LAB | Facility: CLINIC | Age: 67
End: 2022-07-07
Payer: COMMERCIAL

## 2022-07-07 DIAGNOSIS — E55.9 VITAMIN D DEFICIENCY: ICD-10-CM

## 2022-07-07 DIAGNOSIS — Z11.59 ENCOUNTER FOR HEPATITIS C SCREENING TEST FOR LOW RISK PATIENT: ICD-10-CM

## 2022-07-07 DIAGNOSIS — Z13.1 SCREENING FOR DIABETES MELLITUS: ICD-10-CM

## 2022-07-07 DIAGNOSIS — Z13.220 SCREENING, LIPID: ICD-10-CM

## 2022-07-07 DIAGNOSIS — Z12.11 SCREENING FOR COLON CANCER: ICD-10-CM

## 2022-07-07 LAB
25(OH)D3 SERPL-MCNC: 61.4 NG/ML (ref 30–100)
CHOLEST SERPL-MCNC: 204 MG/DL
GLUCOSE P FAST SERPL-MCNC: 104 MG/DL (ref 65–99)
HDLC SERPL-MCNC: 54 MG/DL
LDLC SERPL CALC-MCNC: 121 MG/DL (ref 0–100)
TRIGL SERPL-MCNC: 143 MG/DL

## 2022-07-07 PROCEDURE — 82947 ASSAY GLUCOSE BLOOD QUANT: CPT

## 2022-07-07 PROCEDURE — 82306 VITAMIN D 25 HYDROXY: CPT

## 2022-07-07 PROCEDURE — 36415 COLL VENOUS BLD VENIPUNCTURE: CPT

## 2022-07-07 PROCEDURE — 86803 HEPATITIS C AB TEST: CPT

## 2022-07-07 PROCEDURE — 80061 LIPID PANEL: CPT

## 2022-07-08 LAB — HCV AB SER QL: NORMAL

## 2022-07-14 ENCOUNTER — OFFICE VISIT (OUTPATIENT)
Dept: FAMILY MEDICINE CLINIC | Facility: CLINIC | Age: 67
End: 2022-07-14
Payer: COMMERCIAL

## 2022-07-14 VITALS
BODY MASS INDEX: 31.23 KG/M2 | DIASTOLIC BLOOD PRESSURE: 72 MMHG | TEMPERATURE: 99 F | HEIGHT: 67 IN | SYSTOLIC BLOOD PRESSURE: 120 MMHG | HEART RATE: 72 BPM | OXYGEN SATURATION: 92 % | WEIGHT: 199 LBS

## 2022-07-14 DIAGNOSIS — Z12.31 VISIT FOR SCREENING MAMMOGRAM: ICD-10-CM

## 2022-07-14 DIAGNOSIS — Z78.0 POSTMENOPAUSAL ESTROGEN DEFICIENCY: ICD-10-CM

## 2022-07-14 DIAGNOSIS — E55.9 VITAMIN D DEFICIENCY: ICD-10-CM

## 2022-07-14 DIAGNOSIS — G89.29 CHRONIC PAIN OF LEFT KNEE: Primary | ICD-10-CM

## 2022-07-14 DIAGNOSIS — R73.9 HYPERGLYCEMIA: ICD-10-CM

## 2022-07-14 DIAGNOSIS — Z12.31 ENCOUNTER FOR SCREENING MAMMOGRAM FOR BREAST CANCER: ICD-10-CM

## 2022-07-14 DIAGNOSIS — E78.2 HYPERLIPIDEMIA, MIXED: ICD-10-CM

## 2022-07-14 DIAGNOSIS — Z23 NEED FOR PNEUMOCOCCAL VACCINATION: ICD-10-CM

## 2022-07-14 DIAGNOSIS — Z13.31 ENCOUNTER FOR SCREENING FOR DEPRESSION: ICD-10-CM

## 2022-07-14 DIAGNOSIS — M25.562 CHRONIC PAIN OF LEFT KNEE: Primary | ICD-10-CM

## 2022-07-14 DIAGNOSIS — Z00.00 MEDICARE ANNUAL WELLNESS VISIT, SUBSEQUENT: ICD-10-CM

## 2022-07-14 PROBLEM — U07.1 COVID-19: Status: RESOLVED | Noted: 2021-12-16 | Resolved: 2022-07-14

## 2022-07-14 PROCEDURE — G0444 DEPRESSION SCREEN ANNUAL: HCPCS | Performed by: FAMILY MEDICINE

## 2022-07-14 PROCEDURE — 1170F FXNL STATUS ASSESSED: CPT | Performed by: FAMILY MEDICINE

## 2022-07-14 PROCEDURE — 3288F FALL RISK ASSESSMENT DOCD: CPT | Performed by: FAMILY MEDICINE

## 2022-07-14 PROCEDURE — G0009 ADMIN PNEUMOCOCCAL VACCINE: HCPCS | Performed by: FAMILY MEDICINE

## 2022-07-14 PROCEDURE — 1125F AMNT PAIN NOTED PAIN PRSNT: CPT | Performed by: FAMILY MEDICINE

## 2022-07-14 PROCEDURE — 90677 PCV20 VACCINE IM: CPT | Performed by: FAMILY MEDICINE

## 2022-07-14 PROCEDURE — 99214 OFFICE O/P EST MOD 30 MIN: CPT | Performed by: FAMILY MEDICINE

## 2022-07-14 PROCEDURE — 3725F SCREEN DEPRESSION PERFORMED: CPT | Performed by: FAMILY MEDICINE

## 2022-07-14 PROCEDURE — G0439 PPPS, SUBSEQ VISIT: HCPCS | Performed by: FAMILY MEDICINE

## 2022-07-14 PROCEDURE — 1160F RVW MEDS BY RX/DR IN RCRD: CPT | Performed by: FAMILY MEDICINE

## 2022-07-14 NOTE — PATIENT INSTRUCTIONS
Medicare Preventive Visit Patient Instructions  Thank you for completing your Welcome to Medicare Visit or Medicare Annual Wellness Visit today  Your next wellness visit will be due in one year (7/15/2023)  The screening/preventive services that you may require over the next 5-10 years are detailed below  Some tests may not apply to you based off risk factors and/or age  Screening tests ordered at today's visit but not completed yet may show as past due  Also, please note that scanned in results may not display below  Preventive Screenings:  Service Recommendations Previous Testing/Comments   Colorectal Cancer Screening  * Colonoscopy    * Fecal Occult Blood Test (FOBT)/Fecal Immunochemical Test (FIT)  * Fecal DNA/Cologuard Test  * Flexible Sigmoidoscopy Age: 54-65 years old   Colonoscopy: every 10 years (may be performed more frequently if at higher risk)  OR  FOBT/FIT: every 1 year  OR  Cologuard: every 3 years  OR  Sigmoidoscopy: every 5 years  Screening may be recommended earlier than age 48 if at higher risk for colorectal cancer  Also, an individualized decision between you and your healthcare provider will decide whether screening between the ages of 74-80 would be appropriate  Colonoscopy: Not on file  FOBT/FIT: Not on file  Cologuard: 07/30/2021  Sigmoidoscopy: Not on file    Screening Current     Breast Cancer Screening Age: 36 years old  Frequency: every 1-2 years  Not required if history of left and right mastectomy Mammogram: 11/18/2020    Screening Current   Cervical Cancer Screening Between the ages of 21-29, pap smear recommended once every 3 years  Between the ages of 33-67, can perform pap smear with HPV co-testing every 5 years     Recommendations may differ for women with a history of total hysterectomy, cervical cancer, or abnormal pap smears in past  Pap Smear: Not on file    Screening Not Indicated   Hepatitis C Screening Once for adults born between 1945 and 1965  More frequently in patients at high risk for Hepatitis C Hep C Antibody: 07/07/2022    Screening Current   Diabetes Screening 1-2 times per year if you're at risk for diabetes or have pre-diabetes Fasting glucose: 104 mg/dL   A1C: No results in last 5 years    Screening Current   Cholesterol Screening Once every 5 years if you don't have a lipid disorder  May order more often based on risk factors  Lipid panel: 07/07/2022    Screening Current     Other Preventive Screenings Covered by Medicare:  1  Abdominal Aortic Aneurysm (AAA) Screening: covered once if your at risk  You're considered to be at risk if you have a family history of AAA  2  Lung Cancer Screening: covers low dose CT scan once per year if you meet all of the following conditions: (1) Age 50-69; (2) No signs or symptoms of lung cancer; (3) Current smoker or have quit smoking within the last 15 years; (4) You have a tobacco smoking history of at least 30 pack years (packs per day multiplied by number of years you smoked); (5) You get a written order from a healthcare provider  3  Glaucoma Screening: covered annually if you're considered high risk: (1) You have diabetes OR (2) Family history of glaucoma OR (3)  aged 48 and older OR (3)  American aged 72 and older  3  Osteoporosis Screening: covered every 2 years if you meet one of the following conditions: (1) You're estrogen deficient and at risk for osteoporosis based off medical history and other findings; (2) Have a vertebral abnormality; (3) On glucocorticoid therapy for more than 3 months; (4) Have primary hyperparathyroidism; (5) On osteoporosis medications and need to assess response to drug therapy  · Last bone density test (DXA Scan): 09/29/2016   5  HIV Screening: covered annually if you're between the age of 15-65  Also covered annually if you are younger than 13 and older than 72 with risk factors for HIV infection   For pregnant patients, it is covered up to 3 times per pregnancy  Immunizations:  Immunization Recommendations   Influenza Vaccine Annual influenza vaccination during flu season is recommended for all persons aged >= 6 months who do not have contraindications   Pneumococcal Vaccine (Prevnar and Pneumovax)  * Prevnar = PCV13  * Pneumovax = PPSV23   Adults 25-60 years old: 1-3 doses may be recommended based on certain risk factors  Adults 72 years old: Prevnar (PCV13) vaccine recommended followed by Pneumovax (PPSV23) vaccine  If already received PPSV23 since turning 65, then PCV13 recommended at least one year after PPSV23 dose  Hepatitis B Vaccine 3 dose series if at intermediate or high risk (ex: diabetes, end stage renal disease, liver disease)   Tetanus (Td) Vaccine - COST NOT COVERED BY MEDICARE PART B Following completion of primary series, a booster dose should be given every 10 years to maintain immunity against tetanus  Td may also be given as tetanus wound prophylaxis  Tdap Vaccine - COST NOT COVERED BY MEDICARE PART B Recommended at least once for all adults  For pregnant patients, recommended with each pregnancy  Shingles Vaccine (Shingrix) - COST NOT COVERED BY MEDICARE PART B  2 shot series recommended in those aged 48 and above     Health Maintenance Due:      Topic Date Due    Breast Cancer Screening: Mammogram  11/18/2021    Colorectal Cancer Screening  07/30/2024    Hepatitis C Screening  Completed     Immunizations Due:      Topic Date Due    Pneumococcal Vaccine: 65+ Years (1 - PCV) 09/01/2018    COVID-19 Vaccine (4 - Booster for Pfizer series) 05/23/2022    Influenza Vaccine (1) 09/01/2022     Advance Directives   What are advance directives? Advance directives are legal documents that state your wishes and plans for medical care  These plans are made ahead of time in case you lose your ability to make decisions for yourself   Advance directives can apply to any medical decision, such as the treatments you want, and if you want to donate organs  What are the types of advance directives? There are many types of advance directives, and each state has rules about how to use them  You may choose a combination of any of the following:  · Living will: This is a written record of the treatment you want  You can also choose which treatments you do not want, which to limit, and which to stop at a certain time  This includes surgery, medicine, IV fluid, and tube feedings  · Durable power of  for healthcare [facility]): This is a written record that states who you want to make healthcare choices for you when you are unable to make them for yourself  This person, called a proxy, is usually a family member or a friend  You may choose more than 1 proxy  · Do not resuscitate (DNR) order:  A DNR order is used in case your heart stops beating or you stop breathing  It is a request not to have certain forms of treatment, such as CPR  A DNR order may be included in other types of advance directives  · Medical directive: This covers the care that you want if you are in a coma, near death, or unable to make decisions for yourself  You can list the treatments you want for each condition  Treatment may include pain medicine, surgery, blood transfusions, dialysis, IV or tube feedings, and a ventilator (breathing machine)  · Values history: This document has questions about your views, beliefs, and how you feel and think about life  This information can help others choose the care that you would choose  Why are advance directives important? An advance directive helps you control your care  Although spoken wishes may be used, it is better to have your wishes written down  Spoken wishes can be misunderstood, or not followed  Treatments may be given even if you do not want them  An advance directive may make it easier for your family to make difficult choices about your care     Weight Management   Why it is important to manage your weight:  Being overweight increases your risk of health conditions such as heart disease, high blood pressure, type 2 diabetes, and certain types of cancer  It can also increase your risk for osteoarthritis, sleep apnea, and other respiratory problems  Aim for a slow, steady weight loss  Even a small amount of weight loss can lower your risk of health problems  How to lose weight safely:  A safe and healthy way to lose weight is to eat fewer calories and get regular exercise  You can lose up about 1 pound a week by decreasing the number of calories you eat by 500 calories each day  Healthy meal plan for weight management:  A healthy meal plan includes a variety of foods, contains fewer calories, and helps you stay healthy  A healthy meal plan includes the following:  · Eat whole-grain foods more often  A healthy meal plan should contain fiber  Fiber is the part of grains, fruits, and vegetables that is not broken down by your body  Whole-grain foods are healthy and provide extra fiber in your diet  Some examples of whole-grain foods are whole-wheat breads and pastas, oatmeal, brown rice, and bulgur  · Eat a variety of vegetables every day  Include dark, leafy greens such as spinach, kale, marycruz greens, and mustard greens  Eat yellow and orange vegetables such as carrots, sweet potatoes, and winter squash  · Eat a variety of fruits every day  Choose fresh or canned fruit (canned in its own juice or light syrup) instead of juice  Fruit juice has very little or no fiber  · Eat low-fat dairy foods  Drink fat-free (skim) milk or 1% milk  Eat fat-free yogurt and low-fat cottage cheese  Try low-fat cheeses such as mozzarella and other reduced-fat cheeses  · Choose meat and other protein foods that are low in fat  Choose beans or other legumes such as split peas or lentils  Choose fish, skinless poultry (chicken or turkey), or lean cuts of red meat (beef or pork)  Before you cook meat or poultry, cut off any visible fat  · Use less fat and oil  Try baking foods instead of frying them  Add less fat, such as margarine, sour cream, regular salad dressing and mayonnaise to foods  Eat fewer high-fat foods  Some examples of high-fat foods include french fries, doughnuts, ice cream, and cakes  · Eat fewer sweets  Limit foods and drinks that are high in sugar  This includes candy, cookies, regular soda, and sweetened drinks  Exercise:  Exercise at least 30 minutes per day on most days of the week  Some examples of exercise include walking, biking, dancing, and swimming  You can also fit in more physical activity by taking the stairs instead of the elevator or parking farther away from stores  Ask your healthcare provider about the best exercise plan for you  © Copyright [address] 2018 Information is for End User's use only and may not be sold, redistributed or otherwise used for commercial purposes   All illustrations and images included in CareNotes® are the copyrighted property of [employer]  or [employer]

## 2022-07-14 NOTE — PROGRESS NOTES
Assessment and Plan:     Problem List Items Addressed This Visit        Other    Hyperglycemia     Reduce carbs in diet           Relevant Orders    Hemoglobin A1C    Chronic pain of left knee - Primary     Referral to ortho           Relevant Orders    Ambulatory Referral to Orthopedic Surgery    Hyperlipidemia, mixed     Reduce cholesterol in diet           Relevant Orders    Lipid Panel with Direct LDL reflex    Vitamin D deficiency     Continue vitamin D supplement           Relevant Orders    Vitamin D 25 hydroxy      Other Visit Diagnoses     Need for pneumococcal vaccination        Relevant Orders    Pneumococcal Conjugate Vaccine 20-valent (Pcv20) (Completed)    Visit for screening mammogram        Relevant Orders    Mammo screening bilateral w cad    Encounter for screening for depression        Medicare annual wellness visit, subsequent        Postmenopausal estrogen deficiency        Relevant Orders    DXA bone density spine hip and pelvis    Encounter for screening mammogram for breast cancer        Relevant Orders    Mammo screening bilateral w 3d & cad        BMI Counseling: Body mass index is 31 17 kg/m²  The BMI is above normal  Nutrition recommendations include encouraging healthy choices of fruits and vegetables  Exercise recommendations include exercising 3-5 times per week  No pharmacotherapy was ordered  Rationale for BMI follow-up plan is due to patient being overweight or obese  Depression Screening and Follow-up Plan: Patient was screened for depression during today's encounter  They screened negative with a PHQ-2 score of 0  Preventive health issues were discussed with patient, and age appropriate screening tests were ordered as noted in patient's After Visit Summary  Personalized health advice and appropriate referrals for health education or preventive services given if needed, as noted in patient's After Visit Summary       History of Present Illness:     Patient presents for a Medicare Wellness Visit    She is here for a check up  Had labs done  Glucose slightly high  Lipids are a little high too  She reports her diet could be better  She has chronic L knee pain -- would like to see Dr Shakira Reyes who did her hip surgery  Had L knee Xray in 2016 showed OA  Patient Care Team:  Judge Ida MD as PCP - General (Family Medicine)  Chelsea La MD     Review of Systems:     Review of Systems   Constitutional: Negative for activity change, appetite change, chills, fatigue, fever and unexpected weight change  HENT: Negative for congestion, ear discharge, ear pain, postnasal drip, sinus pressure and sore throat  Eyes: Negative for discharge and visual disturbance  Respiratory: Negative for cough, shortness of breath and wheezing  Cardiovascular: Negative for chest pain, palpitations and leg swelling  Gastrointestinal: Negative for abdominal pain, constipation, diarrhea, nausea and vomiting  Endocrine: Negative for cold intolerance, heat intolerance, polydipsia and polyuria  Genitourinary: Negative for difficulty urinating and frequency  Musculoskeletal: Positive for arthralgias  Negative for back pain, joint swelling and myalgias  Skin: Negative for rash  Neurological: Negative for dizziness, weakness, light-headedness, numbness and headaches  Hematological: Negative for adenopathy  Psychiatric/Behavioral: Negative for behavioral problems, confusion, dysphoric mood, sleep disturbance and suicidal ideas  The patient is not nervous/anxious           Problem List:     Patient Active Problem List   Diagnosis    Status post total replacement of right hip    Hyperglycemia    Chronic pain of left knee    Hyperlipidemia, mixed    Vitamin D deficiency      Past Medical and Surgical History:     Past Medical History:   Diagnosis Date    Arthritis     Asthma     Enlarged thyroid     Right hip pain     Scoliosis     Vertigo      Past Surgical History: Procedure Laterality Date    APPENDECTOMY       SECTION      RI TOTAL HIP ARTHROPLASTY Right 2016    Procedure: TOTAL HIP ARTHROPLASTY;  Surgeon: Marva Ayoub MD;  Location: [facility];  Service: Orthopedics      Family History:     Family History   Problem Relation Age of Onset    Heart disease Mother     Heart disease Father       Social History:     Social History     Socioeconomic History    Marital status: Single     Spouse name: None    Number of children: None    Years of education: None    Highest education level: None   Occupational History    None   Tobacco Use    Smoking status: Former Smoker     Packs/day: 1 00     Years: 17 00     Pack years: 17 00     Types: Cigarettes     Quit date:      Years since quittin 5    Smokeless tobacco: Never Used   Substance and Sexual Activity    Alcohol use: No    Drug use: No    Sexual activity: None   Other Topics Concern    None   Social History Narrative    None     Social Determinants of Health     Financial Resource Strain: Not on file   Food Insecurity: Not on file   Transportation Needs: Not on file   Physical Activity: Not on file   Stress: Not on file   Social Connections: Not on file   Intimate Partner Violence: Not on file   Housing Stability: Not on file      Medications and Allergies:     Current Outpatient Medications   Medication Sig Dispense Refill    aspirin 81 MG tablet Take 81 mg by mouth daily      BIOTIN PO Take by mouth      Calcium Carbonate-Vit D-Min (CALCIUM 1200 PO) Take by mouth      Cholecalciferol (VITAMIN D-3 PO) Take by mouth      LUTEIN PO Take by mouth      MAGNESIUM PO Take by mouth      meclizine (ANTIVERT) 25 mg tablet Take 25 mg by mouth 3 (three) times a day as needed        Multiple Vitamin (MULTI VITAMIN DAILY PO) Take by mouth      omega-3-acid ethyl esters (LOVAZA) 1 g capsule Take 2 g by mouth daily      POTASSIUM PO Take by mouth       No current facility-administered medications for this visit  No Known Allergies   Immunizations:     Immunization History   Administered Date(s) Administered    COVID-19 PFIZER VACCINE 0 3 ML IM 02/28/2021, 03/23/2021, 01/23/2022    DTP 05/17/2007    DTaP 5 01/24/2012    HPV Quadrivalent 02/22/2012    Hep A, adult 10/25/2011    Hep B, adult 02/02/2012    Influenza, high dose seasonal 0 7 mL 10/20/2021    Influenza, seasonal, injectable 08/19/2014    MMR 04/20/2012    Meningococcal, Unknown Serogroups 10/14/2011    Pneumococcal Conjugate Vaccine 20-valent (Pcv20), Polysace 07/14/2022    Pneumococcal Polysaccharide PPV23 10/06/2009, 12/08/2016    Varicella 03/13/2008    Zoster 10/06/2009, 05/05/2015      Health Maintenance:         Topic Date Due    Breast Cancer Screening: Mammogram  11/18/2021    Colorectal Cancer Screening  07/30/2024    Hepatitis C Screening  Completed         Topic Date Due    COVID-19 Vaccine (4 - Booster for Pfizer series) 05/23/2022    Influenza Vaccine (1) 09/01/2022      Medicare Screening Tests and Risk Assessments:     Heriberto Silver is here for her Subsequent Wellness visit  Last Medicare Wellness visit information reviewed, patient interviewed and updates made to the record today  Health Risk Assessment:   Patient rates overall health as good  Patient feels that their physical health rating is same  Patient is dissatisfied with their life  Eyesight was rated as same  Hearing was rated as slightly worse  Patient feels that their emotional and mental health rating is same  Patients states they are sometimes angry  Patient states they are sometimes unusually tired/fatigued  Pain experienced in the last 7 days has been some  Patient's pain rating has been 2/10  Patient states that she has experienced no weight loss or gain in last 6 months  Depression Screening:   PHQ-2 Score: 0      Fall Risk Screening:    In the past year, patient has experienced: no history of falling in past year      Urinary Incontinence Screening:   Patient has not leaked urine accidently in the last six months  Home Safety:  Patient does not have trouble with stairs inside or outside of their home  Patient has working smoke alarms and has working carbon monoxide detector  Home safety hazards include: none  Nutrition:   Current diet is Regular, Low Carb, No Added Salt, Limited junk food and Other (please comment)  Vegetarian mainly    Medications:   Patient is currently taking over-the-counter supplements  OTC medications include: Previously listed  Patient is able to manage medications  Activities of Daily Living (ADLs)/Instrumental Activities of Daily Living (IADLs):   Walk and transfer into and out of bed and chair?: Yes  Dress and groom yourself?: Yes    Bathe or shower yourself?: Yes    Feed yourself?  Yes  Do your laundry/housekeeping?: Yes  Manage your money, pay your bills and track your expenses?: Yes  Make your own meals?: Yes    Do your own shopping?: Yes    Previous Hospitalizations:   Any hospitalizations or ED visits within the last 12 months?: No      Advance Care Planning:   Living will: Yes    Durable POA for healthcare: No    Advanced directive: Yes      Cognitive Screening:   Provider or family/friend/caregiver concerned regarding cognition?: No    PREVENTIVE SCREENINGS      Cardiovascular Screening:    General: Screening Current and Risks and Benefits Discussed      Diabetes Screening:     General: Screening Current and Risks and Benefits Discussed      Colorectal Cancer Screening:     General: Screening Current      Breast Cancer Screening:     General: Screening Current and Risks and Benefits Discussed    Due for: Mammogram        Cervical Cancer Screening:    General: Screening Not Indicated      Osteoporosis Screening:    General: Risks and Benefits Discussed    Due for: DXA Axial      Abdominal Aortic Aneurysm (AAA) Screening:        General: Screening Not Indicated      Lung Cancer Screening: General: Screening Not Indicated      Hepatitis C Screening:    General: Screening Current    Screening, Brief Intervention, and Referral to Treatment (SBIRT)    Screening  Typical number of drinks in a day: 0  Typical number of drinks in a week: 0  Interpretation: Low risk drinking behavior  AUDIT-C Screenin) How often did you have a drink containing alcohol in the past year? never  2) How many drinks did you have on a typical day when you were drinking in the past year? 0  3) How often did you have 6 or more drinks on one occasion in the past year? never    AUDIT-C Score: 0  Interpretation: Score 0-2 (female): Negative screen for alcohol misuse    Single Item Drug Screening:  How often have you used an illegal drug (including marijuana) or a prescription medication for non-medical reasons in the past year? never    Single Item Drug Screen Score: 0  Interpretation: Negative screen for possible drug use disorder    Brief Intervention  Alcohol & drug use screenings were reviewed  No concerns regarding substance use disorder identified  Other Counseling Topics:   Regular weightbearing exercise  No exam data present     Physical Exam:     /72   Pulse 72   Temp 99 °F (37 2 °C)   Ht 5' 7" (1 702 m)   Wt 90 3 kg (199 lb)   SpO2 92%   BMI 31 17 kg/m²     Physical Exam  Constitutional:       General: She is not in acute distress  Appearance: Normal appearance  She is well-developed  She is not ill-appearing, toxic-appearing or diaphoretic  HENT:      Head: Normocephalic and atraumatic  Right Ear: Tympanic membrane, ear canal and external ear normal       Left Ear: Tympanic membrane, ear canal and external ear normal       Mouth/Throat:      Mouth: Mucous membranes are moist       Pharynx: Oropharynx is clear  No oropharyngeal exudate  Eyes:      General: No scleral icterus  Right eye: No discharge  Left eye: No discharge        Conjunctiva/sclera: Conjunctivae normal  Pupils: Pupils are equal, round, and reactive to light  Neck:      Thyroid: No thyromegaly  Cardiovascular:      Rate and Rhythm: Normal rate and regular rhythm  Heart sounds: Normal heart sounds  No murmur heard  No friction rub  No gallop  Pulmonary:      Effort: Pulmonary effort is normal  No respiratory distress  Breath sounds: Normal breath sounds  No wheezing or rales  Chest:      Chest wall: No tenderness  Abdominal:      General: Bowel sounds are normal  There is no distension  Palpations: Abdomen is soft  There is no mass  Tenderness: There is no abdominal tenderness  There is no guarding or rebound  Hernia: No hernia is present  Musculoskeletal:      Left knee: Swelling and deformity present  Tenderness present  Lymphadenopathy:      Cervical: No cervical adenopathy  Skin:     General: Skin is warm  Findings: No rash  Neurological:      General: No focal deficit present  Mental Status: She is alert and oriented to person, place, and time  Mental status is at baseline  Cranial Nerves: No cranial nerve deficit  Psychiatric:         Mood and Affect: Mood normal          Behavior: Behavior normal          Thought Content:  Thought content normal          Judgment: Judgment normal           Tray Hutchison MD

## 2022-09-09 ENCOUNTER — APPOINTMENT (OUTPATIENT)
Dept: RADIOLOGY | Facility: MEDICAL CENTER | Age: 67
End: 2022-09-09
Payer: COMMERCIAL

## 2022-09-09 ENCOUNTER — OFFICE VISIT (OUTPATIENT)
Dept: OBGYN CLINIC | Facility: MEDICAL CENTER | Age: 67
End: 2022-09-09
Payer: COMMERCIAL

## 2022-09-09 VITALS
HEIGHT: 67 IN | SYSTOLIC BLOOD PRESSURE: 132 MMHG | BODY MASS INDEX: 31.55 KG/M2 | WEIGHT: 201 LBS | HEART RATE: 76 BPM | DIASTOLIC BLOOD PRESSURE: 81 MMHG

## 2022-09-09 DIAGNOSIS — M25.562 CHRONIC PAIN OF LEFT KNEE: ICD-10-CM

## 2022-09-09 DIAGNOSIS — M25.562 LEFT KNEE PAIN, UNSPECIFIED CHRONICITY: Primary | ICD-10-CM

## 2022-09-09 DIAGNOSIS — M25.562 LEFT KNEE PAIN, UNSPECIFIED CHRONICITY: ICD-10-CM

## 2022-09-09 DIAGNOSIS — G89.29 CHRONIC PAIN OF LEFT KNEE: ICD-10-CM

## 2022-09-09 PROCEDURE — 1160F RVW MEDS BY RX/DR IN RCRD: CPT | Performed by: ORTHOPAEDIC SURGERY

## 2022-09-09 PROCEDURE — 99214 OFFICE O/P EST MOD 30 MIN: CPT | Performed by: ORTHOPAEDIC SURGERY

## 2022-09-09 PROCEDURE — 73560 X-RAY EXAM OF KNEE 1 OR 2: CPT

## 2022-09-09 NOTE — PROGRESS NOTES
Subjective;    47-year-old adult female known to the practice  She has received and underwent successful total hip surgery right hip  She presents the office today secondary to a varus left knee  She has sporadic occasions where the knee hyperextends and is unstable in a rotary fashion  Despite this instability she relates very little pain,     And no need for heroic  Treatment  She had x-rays obtained on arrival to the office  Past Medical History:   Diagnosis Date    Arthritis     Asthma     Enlarged thyroid     Right hip pain     Scoliosis     Vertigo        Past Surgical History:   Procedure Laterality Date    APPENDECTOMY       SECTION      LA TOTAL HIP ARTHROPLASTY Right 2016    Procedure: TOTAL HIP ARTHROPLASTY;  Surgeon: Ronald Kulkarni MD;  Location: [facility];  Service: Orthopedics       Family History   Problem Relation Age of Onset    Heart disease Mother     Heart disease Father        Social History     Tobacco Use    Smoking status: Former Smoker     Packs/day: 1      Years: 17      Pack years: 17      Types: Cigarettes     Quit date:      Years since quittin 7    Smokeless tobacco: Never Used   Vaping Use    Vaping Use: Never used   Substance Use Topics    Alcohol use: No    Drug use: No     Exam;    Adult female in no acute distress  Her left knee extends  It is indeed varus  It has a propensity to hyper extension  She has medial greater than lateral compartmental pain  She has the absence of calf pain    X-rays; left knee series shows a varus left knee bone on bone opposition of the medial compartment with significant subchondral sclerosis  Impression; Left knee osteoarthritis  Varus left knee  Left knee pain  Left knee instability  History of right total hip replacement,-successful    Plan; The perioperative period surrounding left total knee replacement was discussed with the patient    She does not find her pain or debilitation that significant as yet  She intends to inform us in the future of escalation of her discomfort and a desire for surgery  She is aware that we are booking out proximally to 3 months  All of her questions regarding knee replacement arthroplasty were answered in great detail and with the use of a lucent model

## 2022-10-05 ENCOUNTER — HOSPITAL ENCOUNTER (OUTPATIENT)
Dept: MAMMOGRAPHY | Facility: CLINIC | Age: 67
Discharge: HOME | End: 2022-10-05
Payer: COMMERCIAL

## 2022-10-05 ENCOUNTER — HOSPITAL ENCOUNTER (OUTPATIENT)
Dept: BONE DENSITY | Facility: CLINIC | Age: 67
Discharge: HOME | End: 2022-10-05
Payer: COMMERCIAL

## 2022-10-05 DIAGNOSIS — Z78.0 POSTMENOPAUSAL ESTROGEN DEFICIENCY: ICD-10-CM

## 2022-10-05 DIAGNOSIS — Z12.31 ENCOUNTER FOR SCREENING MAMMOGRAM FOR BREAST CANCER: ICD-10-CM

## 2022-10-05 PROCEDURE — 77067 SCR MAMMO BI INCL CAD: CPT

## 2022-10-05 PROCEDURE — 77063 BREAST TOMOSYNTHESIS BI: CPT

## 2022-10-28 ENCOUNTER — HOSPITAL ENCOUNTER (OUTPATIENT)
Dept: BONE DENSITY | Facility: CLINIC | Age: 67
Discharge: HOME | End: 2022-10-28
Payer: COMMERCIAL

## 2022-10-28 PROCEDURE — 77080 DXA BONE DENSITY AXIAL: CPT

## 2023-06-27 ENCOUNTER — TELEPHONE (OUTPATIENT)
Dept: FAMILY MEDICINE CLINIC | Facility: CLINIC | Age: 68
End: 2023-06-27

## 2023-06-27 DIAGNOSIS — Z13.0 ENCOUNTER FOR SCREENING FOR HEMATOLOGIC DISORDER: ICD-10-CM

## 2023-06-27 DIAGNOSIS — E55.9 VITAMIN D DEFICIENCY: ICD-10-CM

## 2023-06-27 DIAGNOSIS — E78.2 HYPERLIPIDEMIA, MIXED: ICD-10-CM

## 2023-06-27 DIAGNOSIS — R73.9 HYPERGLYCEMIA: Primary | ICD-10-CM

## 2023-07-20 ENCOUNTER — APPOINTMENT (OUTPATIENT)
Dept: LAB | Facility: CLINIC | Age: 68
End: 2023-07-20
Payer: COMMERCIAL

## 2023-07-20 DIAGNOSIS — E78.2 HYPERLIPIDEMIA, MIXED: ICD-10-CM

## 2023-07-20 DIAGNOSIS — Z13.0 ENCOUNTER FOR SCREENING FOR HEMATOLOGIC DISORDER: ICD-10-CM

## 2023-07-20 DIAGNOSIS — E55.9 VITAMIN D DEFICIENCY: ICD-10-CM

## 2023-07-20 DIAGNOSIS — R73.9 HYPERGLYCEMIA: ICD-10-CM

## 2023-07-20 LAB
25(OH)D3 SERPL-MCNC: 73.6 NG/ML (ref 30–100)
ALBUMIN SERPL BCP-MCNC: 4.2 G/DL (ref 3.5–5)
ALP SERPL-CCNC: 72 U/L (ref 46–116)
ALT SERPL W P-5'-P-CCNC: 24 U/L (ref 12–78)
ANION GAP SERPL CALCULATED.3IONS-SCNC: 4 MMOL/L
AST SERPL W P-5'-P-CCNC: 15 U/L (ref 5–45)
BASOPHILS # BLD AUTO: 0.06 THOUSANDS/ÂΜL (ref 0–0.1)
BASOPHILS NFR BLD AUTO: 1 % (ref 0–1)
BILIRUB SERPL-MCNC: 0.94 MG/DL (ref 0.2–1)
BUN SERPL-MCNC: 14 MG/DL (ref 5–25)
CALCIUM SERPL-MCNC: 9.4 MG/DL (ref 8.3–10.1)
CHLORIDE SERPL-SCNC: 113 MMOL/L (ref 96–108)
CHOLEST SERPL-MCNC: 188 MG/DL
CO2 SERPL-SCNC: 26 MMOL/L (ref 21–32)
CREAT SERPL-MCNC: 0.84 MG/DL (ref 0.6–1.3)
EOSINOPHIL # BLD AUTO: 0.16 THOUSAND/ÂΜL (ref 0–0.61)
EOSINOPHIL NFR BLD AUTO: 3 % (ref 0–6)
ERYTHROCYTE [DISTWIDTH] IN BLOOD BY AUTOMATED COUNT: 12 % (ref 11.6–15.1)
EST. AVERAGE GLUCOSE BLD GHB EST-MCNC: 100 MG/DL
GFR SERPL CREATININE-BSD FRML MDRD: 71 ML/MIN/1.73SQ M
GLUCOSE P FAST SERPL-MCNC: 95 MG/DL (ref 65–99)
HBA1C MFR BLD: 5.1 %
HCT VFR BLD AUTO: 47.4 % (ref 34.8–46.1)
HDLC SERPL-MCNC: 52 MG/DL
HGB BLD-MCNC: 15.6 G/DL (ref 11.5–15.4)
IMM GRANULOCYTES # BLD AUTO: 0.02 THOUSAND/UL (ref 0–0.2)
IMM GRANULOCYTES NFR BLD AUTO: 0 % (ref 0–2)
LDLC SERPL CALC-MCNC: 108 MG/DL (ref 0–100)
LYMPHOCYTES # BLD AUTO: 1.84 THOUSANDS/ÂΜL (ref 0.6–4.47)
LYMPHOCYTES NFR BLD AUTO: 34 % (ref 14–44)
MCH RBC QN AUTO: 32.8 PG (ref 26.8–34.3)
MCHC RBC AUTO-ENTMCNC: 32.9 G/DL (ref 31.4–37.4)
MCV RBC AUTO: 100 FL (ref 82–98)
MONOCYTES # BLD AUTO: 0.46 THOUSAND/ÂΜL (ref 0.17–1.22)
MONOCYTES NFR BLD AUTO: 9 % (ref 4–12)
NEUTROPHILS # BLD AUTO: 2.87 THOUSANDS/ÂΜL (ref 1.85–7.62)
NEUTS SEG NFR BLD AUTO: 53 % (ref 43–75)
NRBC BLD AUTO-RTO: 0 /100 WBCS
PLATELET # BLD AUTO: 347 THOUSANDS/UL (ref 149–390)
PMV BLD AUTO: 10.5 FL (ref 8.9–12.7)
POTASSIUM SERPL-SCNC: 3.8 MMOL/L (ref 3.5–5.3)
PROT SERPL-MCNC: 7.1 G/DL (ref 6.4–8.4)
RBC # BLD AUTO: 4.76 MILLION/UL (ref 3.81–5.12)
SODIUM SERPL-SCNC: 143 MMOL/L (ref 135–147)
TRIGL SERPL-MCNC: 140 MG/DL
WBC # BLD AUTO: 5.41 THOUSAND/UL (ref 4.31–10.16)

## 2023-07-20 PROCEDURE — 80061 LIPID PANEL: CPT

## 2023-07-20 PROCEDURE — 83036 HEMOGLOBIN GLYCOSYLATED A1C: CPT

## 2023-07-20 PROCEDURE — 80053 COMPREHEN METABOLIC PANEL: CPT

## 2023-07-20 PROCEDURE — 36415 COLL VENOUS BLD VENIPUNCTURE: CPT

## 2023-07-20 PROCEDURE — 85025 COMPLETE CBC W/AUTO DIFF WBC: CPT

## 2023-07-20 PROCEDURE — 82306 VITAMIN D 25 HYDROXY: CPT

## 2023-07-27 PROBLEM — R73.9 HYPERGLYCEMIA: Status: RESOLVED | Noted: 2022-07-14 | Resolved: 2023-07-27

## 2023-07-27 RX ORDER — UBIDECARENONE 75 MG
CAPSULE ORAL
COMMUNITY

## 2023-07-27 RX ORDER — FOLIC ACID 20 MG
CAPSULE ORAL
COMMUNITY

## 2023-07-28 ENCOUNTER — OFFICE VISIT (OUTPATIENT)
Dept: FAMILY MEDICINE CLINIC | Facility: CLINIC | Age: 68
End: 2023-07-28
Payer: COMMERCIAL

## 2023-07-28 VITALS
OXYGEN SATURATION: 95 % | SYSTOLIC BLOOD PRESSURE: 118 MMHG | BODY MASS INDEX: 32.65 KG/M2 | DIASTOLIC BLOOD PRESSURE: 68 MMHG | WEIGHT: 208 LBS | TEMPERATURE: 97.8 F | HEIGHT: 67 IN | HEART RATE: 77 BPM

## 2023-07-28 DIAGNOSIS — Z00.00 MEDICARE ANNUAL WELLNESS VISIT, SUBSEQUENT: Primary | ICD-10-CM

## 2023-07-28 DIAGNOSIS — R79.89 ABNORMAL CBC: ICD-10-CM

## 2023-07-28 PROBLEM — Z97.2 WEARS DENTURES: Status: ACTIVE | Noted: 2023-07-28

## 2023-07-28 PROBLEM — Z78.9 VEGAN DIET: Status: ACTIVE | Noted: 2023-07-28

## 2023-07-28 PROBLEM — Z97.4 WEARS HEARING AID: Status: ACTIVE | Noted: 2023-07-28

## 2023-07-28 PROCEDURE — G0439 PPPS, SUBSEQ VISIT: HCPCS | Performed by: FAMILY MEDICINE

## 2023-07-28 NOTE — PATIENT INSTRUCTIONS
Medicare Preventive Visit Patient Instructions  Thank you for completing your Welcome to Medicare Visit or Medicare Annual Wellness Visit today. Your next wellness visit will be due in one year (7/28/2024). The screening/preventive services that you may require over the next 5-10 years are detailed below. Some tests may not apply to you based off risk factors and/or age. Screening tests ordered at today's visit but not completed yet may show as past due. Also, please note that scanned in results may not display below. Preventive Screenings:  Service Recommendations Previous Testing/Comments   Colorectal Cancer Screening  * Colonoscopy    * Fecal Occult Blood Test (FOBT)/Fecal Immunochemical Test (FIT)  * Fecal DNA/Cologuard Test  * Flexible Sigmoidoscopy Age: 43-73 years old   Colonoscopy: every 10 years (may be performed more frequently if at higher risk)  OR  FOBT/FIT: every 1 year  OR  Cologuard: every 3 years  OR  Sigmoidoscopy: every 5 years  Screening may be recommended earlier than age 39 if at higher risk for colorectal cancer. Also, an individualized decision between you and your healthcare provider will decide whether screening between the ages of 77-80 would be appropriate. Colonoscopy: 07/30/2021  FOBT/FIT: Not on file  Cologuard: 07/30/2021  Sigmoidoscopy: Not on file    Screening Current     Breast Cancer Screening Age: 36 years old  Frequency: every 1-2 years  Not required if history of left and right mastectomy Mammogram: 10/05/2022    Screening Current   Cervical Cancer Screening Between the ages of 21-29, pap smear recommended once every 3 years. Between the ages of 32-69, can perform pap smear with HPV co-testing every 5 years.    Recommendations may differ for women with a history of total hysterectomy, cervical cancer, or abnormal pap smears in past. Pap Smear: Not on file    Screening Not Indicated   Hepatitis C Screening Once for adults born between 1945 and 1965  More frequently in patients at high risk for Hepatitis C Hep C Antibody: 07/07/2022    Screening Current   Diabetes Screening 1-2 times per year if you're at risk for diabetes or have pre-diabetes Fasting glucose: 95 mg/dL (7/20/2023)  A1C: 5.1 % (7/20/2023)  Screening Current   Cholesterol Screening Once every 5 years if you don't have a lipid disorder. May order more often based on risk factors. Lipid panel: 07/20/2023    Screening Not Indicated  History Lipid Disorder     Other Preventive Screenings Covered by Medicare:  1. Abdominal Aortic Aneurysm (AAA) Screening: covered once if your at risk. You're considered to be at risk if you have a family history of AAA. 2. Lung Cancer Screening: covers low dose CT scan once per year if you meet all of the following conditions: (1) Age 48-67; (2) No signs or symptoms of lung cancer; (3) Current smoker or have quit smoking within the last 15 years; (4) You have a tobacco smoking history of at least 20 pack years (packs per day multiplied by number of years you smoked); (5) You get a written order from a healthcare provider. 3. Glaucoma Screening: covered annually if you're considered high risk: (1) You have diabetes OR (2) Family history of glaucoma OR (3)  aged 48 and older OR (3)  American aged 72 and older  3. Osteoporosis Screening: covered every 2 years if you meet one of the following conditions: (1) You're estrogen deficient and at risk for osteoporosis based off medical history and other findings; (2) Have a vertebral abnormality; (3) On glucocorticoid therapy for more than 3 months; (4) Have primary hyperparathyroidism; (5) On osteoporosis medications and need to assess response to drug therapy. · Last bone density test (DXA Scan): 10/28/2022.  5. HIV Screening: covered annually if you're between the age of 15-65. Also covered annually if you are younger than 13 and older than 72 with risk factors for HIV infection.  For pregnant patients, it is covered up to 3 times per pregnancy. Immunizations:  Immunization Recommendations   Influenza Vaccine Annual influenza vaccination during flu season is recommended for all persons aged >= 6 months who do not have contraindications   Pneumococcal Vaccine   * Pneumococcal conjugate vaccine = PCV13 (Prevnar 13), PCV15 (Vaxneuvance), PCV20 (Prevnar 20)  * Pneumococcal polysaccharide vaccine = PPSV23 (Pneumovax) Adults 20-63 years old: 1-3 doses may be recommended based on certain risk factors  Adults 72 years old: 1-2 doses may be recommended based off what pneumonia vaccine you previously received   Hepatitis B Vaccine 3 dose series if at intermediate or high risk (ex: diabetes, end stage renal disease, liver disease)   Tetanus (Td) Vaccine - COST NOT COVERED BY MEDICARE PART B Following completion of primary series, a booster dose should be given every 10 years to maintain immunity against tetanus. Td may also be given as tetanus wound prophylaxis. Tdap Vaccine - COST NOT COVERED BY MEDICARE PART B Recommended at least once for all adults. For pregnant patients, recommended with each pregnancy. Shingles Vaccine (Shingrix) - COST NOT COVERED BY MEDICARE PART B  2 shot series recommended in those aged 48 and above     Health Maintenance Due:      Topic Date Due   • Breast Cancer Screening: Mammogram  10/05/2023   • Colorectal Cancer Screening  07/30/2024   • Hepatitis C Screening  Completed     Immunizations Due:      Topic Date Due   • COVID-19 Vaccine (4 - Pfizer series) 03/20/2022   • Influenza Vaccine (1) 09/01/2023     Advance Directives   What are advance directives? Advance directives are legal documents that state your wishes and plans for medical care. These plans are made ahead of time in case you lose your ability to make decisions for yourself. Advance directives can apply to any medical decision, such as the treatments you want, and if you want to donate organs. What are the types of advance directives? There are many types of advance directives, and each state has rules about how to use them. You may choose a combination of any of the following:  · Living will: This is a written record of the treatment you want. You can also choose which treatments you do not want, which to limit, and which to stop at a certain time. This includes surgery, medicine, IV fluid, and tube feedings. · Durable power of  for healthcare [facility]): This is a written record that states who you want to make healthcare choices for you when you are unable to make them for yourself. This person, called a proxy, is usually a family member or a friend. You may choose more than 1 proxy. · Do not resuscitate (DNR) order:  A DNR order is used in case your heart stops beating or you stop breathing. It is a request not to have certain forms of treatment, such as CPR. A DNR order may be included in other types of advance directives. · Medical directive: This covers the care that you want if you are in a coma, near death, or unable to make decisions for yourself. You can list the treatments you want for each condition. Treatment may include pain medicine, surgery, blood transfusions, dialysis, IV or tube feedings, and a ventilator (breathing machine). · Values history: This document has questions about your views, beliefs, and how you feel and think about life. This information can help others choose the care that you would choose. Why are advance directives important? An advance directive helps you control your care. Although spoken wishes may be used, it is better to have your wishes written down. Spoken wishes can be misunderstood, or not followed. Treatments may be given even if you do not want them. An advance directive may make it easier for your family to make difficult choices about your care.    Weight Management   Why it is important to manage your weight:  Being overweight increases your risk of health conditions such as heart disease, high blood pressure, type 2 diabetes, and certain types of cancer. It can also increase your risk for osteoarthritis, sleep apnea, and other respiratory problems. Aim for a slow, steady weight loss. Even a small amount of weight loss can lower your risk of health problems. How to lose weight safely:  A safe and healthy way to lose weight is to eat fewer calories and get regular exercise. You can lose up about 1 pound a week by decreasing the number of calories you eat by 500 calories each day. Healthy meal plan for weight management:  A healthy meal plan includes a variety of foods, contains fewer calories, and helps you stay healthy. A healthy meal plan includes the following:  · Eat whole-grain foods more often. A healthy meal plan should contain fiber. Fiber is the part of grains, fruits, and vegetables that is not broken down by your body. Whole-grain foods are healthy and provide extra fiber in your diet. Some examples of whole-grain foods are whole-wheat breads and pastas, oatmeal, brown rice, and bulgur. · Eat a variety of vegetables every day. Include dark, leafy greens such as spinach, kale, marycruz greens, and mustard greens. Eat yellow and orange vegetables such as carrots, sweet potatoes, and winter squash. · Eat a variety of fruits every day. Choose fresh or canned fruit (canned in its own juice or light syrup) instead of juice. Fruit juice has very little or no fiber. · Eat low-fat dairy foods. Drink fat-free (skim) milk or 1% milk. Eat fat-free yogurt and low-fat cottage cheese. Try low-fat cheeses such as mozzarella and other reduced-fat cheeses. · Choose meat and other protein foods that are low in fat. Choose beans or other legumes such as split peas or lentils. Choose fish, skinless poultry (chicken or turkey), or lean cuts of red meat (beef or pork). Before you cook meat or poultry, cut off any visible fat. · Use less fat and oil. Try baking foods instead of frying them. Add less fat, such as margarine, sour cream, regular salad dressing and mayonnaise to foods. Eat fewer high-fat foods. Some examples of high-fat foods include french fries, doughnuts, ice cream, and cakes. · Eat fewer sweets. Limit foods and drinks that are high in sugar. This includes candy, cookies, regular soda, and sweetened drinks. Exercise:  Exercise at least 30 minutes per day on most days of the week. Some examples of exercise include walking, biking, dancing, and swimming. You can also fit in more physical activity by taking the stairs instead of the elevator or parking farther away from stores. Ask your healthcare provider about the best exercise plan for you. © Copyright [employer] 2018 Information is for End User's use only and may not be sold, redistributed or otherwise used for commercial purposes.  All illustrations and images included in CareNotes® are the copyrighted property of [employer] or [address] [place] [address]

## 2023-07-28 NOTE — PROGRESS NOTES
Assessment and Plan:     Problem List Items Addressed This Visit    None  Visit Diagnoses     Medicare annual wellness visit, subsequent    -  Primary    Abnormal CBC        Relevant Orders    CBC and differential        Defers US of mass on scalp -- continue to monitor   Will repeat CBC in 2-4 weeks to monitor        Preventive health issues were discussed with patient, and age appropriate screening tests were ordered as noted in patient's After Visit Summary. Personalized health advice and appropriate referrals for health education or preventive services given if needed, as noted in patient's After Visit Summary. History of Present Illness:     Patient presents for a Medicare Wellness Visit and lab review     HPI     Cr 0.84/GFR 71  Lipids: Total 188, , HDL 52, ; LFTs WNL   Glucose 95/A1c 5.1%  Hb 15.6/Hct 47/ (improved from previous anemia)   D 73      Patient Care Team:  Florencio Aase, MD as PCP - General (Family Medicine)  Lauren Murray MD     Review of Systems:     Review of Systems   Constitutional: Negative for unexpected weight change. HENT: Positive for rhinorrhea. Negative for congestion, ear pain and sore throat. Takes an allergy pill everyday   Eyes: Negative for visual disturbance. Respiratory: Negative for cough and shortness of breath ("only when i over exert myself because I lack energy"). Cardiovascular: Negative for chest pain, palpitations and leg swelling. Gastrointestinal: Negative for abdominal pain, constipation and diarrhea. Endocrine: Negative for polyuria. Genitourinary: Negative for dysuria. Musculoskeletal: Positive for neck pain (has cervical vertigo). Neurological: Positive for dizziness (had an episode of the other day, her BP went high -- improved with rest, hydration). Negative for headaches.    Hematological:        (+) bump on scalp near L ear   Psychiatric/Behavioral: Negative for sleep disturbance (only gets 4-5 hours per night).         Problem List:     Patient Active Problem List   Diagnosis   • Status post total replacement of right hip   • Chronic pain of left knee   • Hyperlipidemia, mixed   • Vitamin D deficiency   • Vegan diet   • Wears dentures   • Wears hearing aid      Past Medical and Surgical History:     Past Medical History:   Diagnosis Date   • Arthritis    • Asthma    • Enlarged thyroid    • Right hip pain    • Scoliosis    • Vertigo      Past Surgical History:   Procedure Laterality Date   • APPENDECTOMY     •  SECTION     • VT ARTHRP ACETBLR/PROX FEM PROSTC AGRFT/ALGRFT Right 2016    Procedure: TOTAL HIP ARTHROPLASTY;  Surgeon: Patricia Shelton MD;  Location: [facility];  Service: Orthopedics      Family History:     Family History   Problem Relation Age of Onset   • Heart disease Mother    • Heart disease Father    • Breast cancer Sister 39   • No Known Problems Daughter    • No Known Problems Maternal Grandmother    • No Known Problems Paternal Grandmother    • No Known Problems Maternal Aunt    • No Known Problems Paternal Aunt    • No Known Problems Paternal Aunt    • No Known Problems Paternal Aunt    • No Known Problems Paternal Aunt    • No Known Problems Paternal Aunt       Social History:     Social History     Socioeconomic History   • Marital status: Single     Spouse name: None   • Number of children: None   • Years of education: None   • Highest education level: None   Occupational History   • None   Tobacco Use   • Smoking status: Former     Packs/day: 1.00     Years: 17.00     Total pack years: 17.00     Types: Cigarettes     Quit date:      Years since quittin.5   • Smokeless tobacco: Never   Vaping Use   • Vaping Use: Never used   Substance and Sexual Activity   • Alcohol use: No   • Drug use: No   • Sexual activity: None   Other Topics Concern   • None   Social History Narrative   • None     Social Determinants of Health     Financial Resource Strain: Low Risk (7/21/2023)    Overall Financial Resource Strain (CARDIA)    • Difficulty of Paying Living Expenses: Not very hard   Food Insecurity: Not on file   Transportation Needs: No Transportation Needs (7/21/2023)    PRAPARE - Transportation    • Lack of Transportation (Medical): No    • Lack of Transportation (Non-Medical): No   Physical Activity: Not on file   Stress: Not on file   Social Connections: Not on file   Intimate Partner Violence: Not on file   Housing Stability: Not on file      Medications and Allergies:     Current Outpatient Medications   Medication Sig Dispense Refill   • aspirin 81 MG tablet Take 81 mg by mouth daily     • BIOTIN PO Take by mouth     • Calcium Carbonate-Vit D-Min (CALCIUM 1200 PO) Take by mouth     • Cholecalciferol (VITAMIN D-3 PO) Take by mouth     • cyanocobalamin (VITAMIN B-12) 100 mcg tablet      • Folic Acid 20 MG CAPS      • LUTEIN PO Take by mouth     • MAGNESIUM PO Take by mouth     • meclizine (ANTIVERT) 25 mg tablet Take 25 mg by mouth 3 (three) times a day as needed       • Multiple Vitamin (MULTI VITAMIN DAILY PO) Take by mouth     • omega-3-acid ethyl esters (LOVAZA) 1 g capsule Take 2 g by mouth daily     • POTASSIUM PO Take by mouth       No current facility-administered medications for this visit.      No Known Allergies   Immunizations:     Immunization History   Administered Date(s) Administered   • COVID-19 PFIZER VACCINE 0.3 ML IM 02/28/2021, 03/23/2021, 01/23/2022   • DTP 05/17/2007   • DTaP 5 01/24/2012   • HPV Quadrivalent 02/22/2012   • Hep A, adult 10/25/2011   • Hep B, adult 02/02/2012   • Influenza, high dose seasonal 0.7 mL 10/20/2021   • Influenza, seasonal, injectable 08/19/2014   • MMR 04/20/2012   • Meningococcal, Unknown Serogroups 10/14/2011   • Pneumococcal Conjugate Vaccine 20-valent (Pcv20), Polysace 07/14/2022   • Pneumococcal Polysaccharide PPV23 10/06/2009, 12/08/2016   • Varicella 03/13/2008   • Zoster 10/06/2009, 05/05/2015      Health Maintenance:         Topic Date Due   • Breast Cancer Screening: Mammogram  10/05/2023   • Colorectal Cancer Screening  07/30/2024   • Hepatitis C Screening  Completed         Topic Date Due   • COVID-19 Vaccine (4 - Pfizer series) 03/20/2022   • Influenza Vaccine (1) 09/01/2023      Medicare Screening Tests and Risk Assessments:     Theresa Harris is here for her Subsequent Wellness visit. Health Risk Assessment:   Patient rates overall health as good. Patient feels that their physical health rating is slightly worse. Patient is dissatisfied with their life. Eyesight was rated as same. Hearing was rated as slightly worse. Patient feels that their emotional and mental health rating is slightly worse. Patients states they are sometimes angry. Patient states they are often unusually tired/fatigued. Pain experienced in the last 7 days has been some. Patient's pain rating has been 1/10. Patient states that she has experienced weight loss or gain in last 6 months. Depression Screening:   PHQ-2 Score: 0      Fall Risk Screening: In the past year, patient has experienced: no history of falling in past year      Urinary Incontinence Screening:   Patient has not leaked urine accidently in the last six months. Home Safety:  Patient does not have trouble with stairs inside or outside of their home. Patient has working smoke alarms and has working carbon monoxide detector. Home safety hazards include: none. Nutrition:   Current diet is Other (please comment). Vegetarian,  almost vegan, non diary    Medications:   Patient is currently taking over-the-counter supplements. OTC medications include: Align Probiotics, Azo Cranberry, Mushroom coffee. Patient is able to manage medications. Activities of Daily Living (ADLs)/Instrumental Activities of Daily Living (IADLs):   Walk and transfer into and out of bed and chair?: Yes  Dress and groom yourself?: Yes    Bathe or shower yourself?: Yes    Feed yourself?  Yes  Do your laundry/housekeeping?: Yes  Manage your money, pay your bills and track your expenses?: Yes  Make your own meals?: Yes    Do your own shopping?: Yes    ADL comments: Caring for 81 yo father    Durable Medical Equipment Suppliers  N/A    Previous Hospitalizations:   Any hospitalizations or ED visits within the last 12 months?: No      Advance Care Planning:   Living will: Yes    Durable POA for healthcare: Yes    Advanced directive: Yes      Cognitive Screening:   Provider or family/friend/caregiver concerned regarding cognition?: No    PREVENTIVE SCREENINGS      Cardiovascular Screening:    General: Screening Not Indicated and History Lipid Disorder      Diabetes Screening:     General: Screening Current      Colorectal Cancer Screening:     General: Screening Current      Breast Cancer Screening:     General: Screening Current      Cervical Cancer Screening:    General: Screening Not Indicated      Osteoporosis Screening:    General: Screening Current      Abdominal Aortic Aneurysm (AAA) Screening:        General: Screening Not Indicated      Lung Cancer Screening:     General: Screening Not Indicated      Hepatitis C Screening:    General: Screening Current    Screening, Brief Intervention, and Referral to Treatment (SBIRT)    Screening  Typical number of drinks in a day: 0  Typical number of drinks in a week: 0  Interpretation: Low risk drinking behavior.     AUDIT-C Screenin) How often did you have a drink containing alcohol in the past year? never  2) How many drinks did you have on a typical day when you were drinking in the past year? 0  3) How often did you have 6 or more drinks on one occasion in the past year? never    AUDIT-C Score: 0  Interpretation: Score 0-2 (female): Negative screen for alcohol misuse    Single Item Drug Screening:  How often have you used an illegal drug (including marijuana) or a prescription medication for non-medical reasons in the past year? never    Single Item Drug Screen Score: 0  Interpretation: Negative screen for possible drug use disorder    No results found. Physical Exam:     /68   Pulse 77   Temp 97.8 °F (36.6 °C)   Ht 5' 7" (1.702 m)   Wt 94.3 kg (208 lb)   SpO2 95%   BMI 32.58 kg/m²     Physical Exam  Vitals and nursing note reviewed. Constitutional:       General: She is not in acute distress. Appearance: She is well-developed. HENT:      Head: Normocephalic and atraumatic. Comments: Pea-sized round, mobile, soft, non-tender subcutaneous mass   Approximately 2 cm SK above it      Right Ear: Tympanic membrane, ear canal and external ear normal.      Left Ear: Tympanic membrane, ear canal and external ear normal.      Nose: Nose normal. No rhinorrhea. Mouth/Throat:      Mouth: Mucous membranes are moist.      Pharynx: No oropharyngeal exudate or posterior oropharyngeal erythema. Eyes:      Conjunctiva/sclera: Conjunctivae normal.   Neck:      Thyroid: No thyromegaly. Cardiovascular:      Rate and Rhythm: Normal rate and regular rhythm. Pulmonary:      Effort: Pulmonary effort is normal. No respiratory distress. Breath sounds: Normal breath sounds. Abdominal:      General: Bowel sounds are normal. There is no distension. Palpations: Abdomen is soft. Tenderness: There is no abdominal tenderness. Musculoskeletal:         General: Normal range of motion. Lymphadenopathy:      Cervical: No cervical adenopathy. Skin:     General: Skin is warm and dry. Neurological:      General: No focal deficit present. Mental Status: She is alert.    Psychiatric:         Mood and Affect: Mood normal.     COVID completed primary + booster, Pneumo UTD, Shingles UTD    Norma Arana DO

## 2024-01-17 ENCOUNTER — APPOINTMENT (OUTPATIENT)
Dept: RADIOLOGY | Facility: CLINIC | Age: 69
End: 2024-01-17
Payer: COMMERCIAL

## 2024-01-17 ENCOUNTER — OFFICE VISIT (OUTPATIENT)
Dept: FAMILY MEDICINE CLINIC | Facility: CLINIC | Age: 69
End: 2024-01-17
Payer: COMMERCIAL

## 2024-01-17 VITALS
SYSTOLIC BLOOD PRESSURE: 120 MMHG | HEIGHT: 67 IN | WEIGHT: 203 LBS | OXYGEN SATURATION: 95 % | BODY MASS INDEX: 31.86 KG/M2 | TEMPERATURE: 97.8 F | DIASTOLIC BLOOD PRESSURE: 70 MMHG | HEART RATE: 93 BPM

## 2024-01-17 DIAGNOSIS — R06.09 DOE (DYSPNEA ON EXERTION): Primary | ICD-10-CM

## 2024-01-17 DIAGNOSIS — Z12.31 SCREENING MAMMOGRAM FOR BREAST CANCER: ICD-10-CM

## 2024-01-17 DIAGNOSIS — J43.9 PULMONARY EMPHYSEMA, UNSPECIFIED EMPHYSEMA TYPE (HCC): ICD-10-CM

## 2024-01-17 DIAGNOSIS — R06.09 DOE (DYSPNEA ON EXERTION): ICD-10-CM

## 2024-01-17 DIAGNOSIS — Z23 ENCOUNTER FOR IMMUNIZATION: ICD-10-CM

## 2024-01-17 PROCEDURE — 90662 IIV NO PRSV INCREASED AG IM: CPT | Performed by: FAMILY MEDICINE

## 2024-01-17 PROCEDURE — 93000 ELECTROCARDIOGRAM COMPLETE: CPT | Performed by: FAMILY MEDICINE

## 2024-01-17 PROCEDURE — 99214 OFFICE O/P EST MOD 30 MIN: CPT | Performed by: FAMILY MEDICINE

## 2024-01-17 PROCEDURE — 71046 X-RAY EXAM CHEST 2 VIEWS: CPT

## 2024-01-17 PROCEDURE — G0008 ADMIN INFLUENZA VIRUS VAC: HCPCS | Performed by: FAMILY MEDICINE

## 2024-01-17 RX ORDER — LORATADINE 10 MG/1
10 TABLET, ORALLY DISINTEGRATING ORAL DAILY
COMMUNITY

## 2024-01-17 RX ORDER — ALBUTEROL SULFATE 90 UG/1
2 AEROSOL, METERED RESPIRATORY (INHALATION) EVERY 6 HOURS PRN
Qty: 18 G | Refills: 5 | Status: SHIPPED | OUTPATIENT
Start: 2024-01-17

## 2024-01-17 NOTE — ASSESSMENT & PLAN NOTE
EKG today is normal.  CXR ordered  PFT and stress ECHO -ordered- patient says she is moving in 3 weeks so advised her to find PCP in new location to continue work up.

## 2024-01-17 NOTE — PROGRESS NOTES
Assessment/Plan:         Problem List Items Addressed This Visit        Other    ABREU (dyspnea on exertion) - Primary     EKG today is normal.  CXR ordered  PFT and stress ECHO -ordered- patient says she is moving in 3 weeks so advised her to find PCP in new location to continue work up.         Relevant Medications    albuterol (Ventolin HFA) 90 mcg/act inhaler    Other Relevant Orders    XR chest pa & lateral    Complete PFT with post bronchodilator    Echo stress test, dobutamine    POCT ECG (Completed)   Other Visit Diagnoses     Encounter for immunization        Relevant Orders    influenza vaccine, high-dose, PF 0.7 mL (FLUZONE HIGH-DOSE) (Completed)    Screening mammogram for breast cancer        Relevant Orders    Mammo screening bilateral w 3d & cad            Subjective:      Patient ID: May Concepcion is a 68 y.o. female.    She reports feeling shortness of breath when exerting herself. She recently noticed this [place] She says her pulse rate went up to 111. No chest pain. She says she has a h/o Asthma.  Former smoker quit 20 years ago.  25 pack year history.          The following portions of the patient's history were reviewed and updated as appropriate:   Past Medical History:  She has a past medical history of Arthritis, Asthma, Enlarged thyroid, Right hip pain, Scoliosis, and Vertigo.,  _______________________________________________________________________  Medical Problems:  does not have any pertinent problems on file.,  _______________________________________________________________________  Past Surgical History:   has a past surgical history that includes  section; Appendectomy; and pr arthrp acetblr/prox fem prostc agrft/algrft (Right, 2016).,  _______________________________________________________________________  Family History:  family history includes Breast cancer (age of onset: 45) in her sister; Heart disease in her father  and mother; No Known Problems in her daughter, maternal aunt, maternal grandmother, paternal aunt, paternal aunt, paternal aunt, paternal aunt, paternal aunt, and paternal grandmother.,  _______________________________________________________________________  Social History:   reports that she quit smoking about 20 years ago. Her smoking use included cigarettes. She started smoking about 37 years ago. She has a 17.0 pack-year smoking history. She has never used smokeless tobacco. She reports that she does not drink alcohol and does not use drugs.,  _______________________________________________________________________  Allergies:  has No Known Allergies..  _______________________________________________________________________  Current Outpatient Medications   Medication Sig Dispense Refill   • albuterol (Ventolin HFA) 90 mcg/act inhaler Inhale 2 puffs every 6 (six) hours as needed for wheezing 18 g 5   • aspirin 81 MG tablet Take 81 mg by mouth daily     • BIOTIN PO Take by mouth     • Calcium Carbonate-Vit D-Min (CALCIUM 1200 PO) Take by mouth     • Cholecalciferol (VITAMIN D-3 PO) Take by mouth     • cyanocobalamin (VITAMIN B-12) 100 mcg tablet      • loratadine (CLARITIN REDITABS) 10 MG dissolvable tablet Take 10 mg by mouth daily     • LUTEIN PO Take by mouth     • MAGNESIUM PO Take by mouth     • meclizine (ANTIVERT) 25 mg tablet Take 25 mg by mouth 3 (three) times a day as needed       • Multiple Vitamin (MULTI VITAMIN DAILY PO) Take by mouth     • POTASSIUM PO Take by mouth     • omega-3-acid ethyl esters (LOVAZA) 1 g capsule Take 2 g by mouth daily       No current facility-administered medications for this visit.     _______________________________________________________________________  Review of Systems   Constitutional:  Negative for activity change.   Respiratory:  Positive for shortness of breath. Negative for chest tightness and wheezing.    Cardiovascular:  Negative for chest pain and leg  "swelling.   Neurological:  Negative for headaches.   Psychiatric/Behavioral:  Negative for dysphoric mood. The patient is not nervous/anxious.          Objective:  Vitals:    01/17/24 1115 01/17/24 1143   BP: 138/80 120/70   BP Location: Left arm    Patient Position: Sitting    Pulse: 93    Temp: 97.8 °F (36.6 °C)    TempSrc: Temporal    SpO2: 95%    Weight: 92.1 kg (203 lb)    Height: 5' 7\" (1.702 m)      Body mass index is 31.79 kg/m².     Physical Exam  Vitals and nursing note reviewed.   Constitutional:       General: She is not in acute distress.     Appearance: Normal appearance. She is not ill-appearing, toxic-appearing or diaphoretic.   HENT:      Head: Normocephalic and atraumatic.      Right Ear: External ear normal.      Left Ear: External ear normal.   Cardiovascular:      Rate and Rhythm: Normal rate and regular rhythm.      Heart sounds: No murmur heard.     No friction rub.   Pulmonary:      Effort: Pulmonary effort is normal. No respiratory distress.      Breath sounds: Normal breath sounds. No stridor. No wheezing, rhonchi or rales.   Musculoskeletal:         General: No swelling.      Right lower leg: No edema.      Left lower leg: No edema.   Neurological:      General: No focal deficit present.      Mental Status: She is alert. Mental status is at baseline.   Psychiatric:         Attention and Perception: Attention normal.         Mood and Affect: Mood normal.         Speech: Speech normal.         Behavior: Behavior normal.         Thought Content: Thought content normal.         Judgment: Judgment normal.         EKG: normal EKG, normal sinus rhythm.    "

## 2024-05-07 ENCOUNTER — VBI (OUTPATIENT)
Dept: ADMINISTRATIVE | Facility: OTHER | Age: 69
End: 2024-05-07